# Patient Record
Sex: FEMALE | Race: BLACK OR AFRICAN AMERICAN | NOT HISPANIC OR LATINO | Employment: FULL TIME | URBAN - METROPOLITAN AREA
[De-identification: names, ages, dates, MRNs, and addresses within clinical notes are randomized per-mention and may not be internally consistent; named-entity substitution may affect disease eponyms.]

---

## 2023-04-17 ENCOUNTER — OFFICE VISIT (OUTPATIENT)
Dept: OBSTETRICS AND GYNECOLOGY | Facility: CLINIC | Age: 28
End: 2023-04-17
Payer: COMMERCIAL

## 2023-04-17 VITALS
BODY MASS INDEX: 34.79 KG/M2 | DIASTOLIC BLOOD PRESSURE: 70 MMHG | HEIGHT: 60 IN | WEIGHT: 177.2 LBS | SYSTOLIC BLOOD PRESSURE: 116 MMHG

## 2023-04-17 DIAGNOSIS — Z87.42 HISTORY OF ABNORMAL CERVICAL PAP SMEAR: ICD-10-CM

## 2023-04-17 DIAGNOSIS — Z01.419 WOMEN'S ANNUAL ROUTINE GYNECOLOGICAL EXAMINATION: Primary | ICD-10-CM

## 2023-04-17 RX ORDER — ACETAMINOPHEN AND CODEINE PHOSPHATE 120; 12 MG/5ML; MG/5ML
1 SOLUTION ORAL DAILY
COMMUNITY

## 2023-04-17 RX ORDER — ESCITALOPRAM OXALATE 10 MG/1
TABLET ORAL
COMMUNITY
Start: 2023-03-16

## 2023-04-17 NOTE — PROGRESS NOTES
Gynecologic Annual Exam Note        Annual Exam        Subjective     HPI  Fareed Garcia is a 28 y.o.  female who presents for annual well woman exam as a established patient. There were no changes to her medical or surgical history since her last visit.. Patient reports problems with: pelvic pain that has been present for about 2-3 months that is mild and intermittent. Patient is concerned for fibroids. Patient's last menstrual period was 2023 (exact date).. Her periods occur every 25-35 days , lasting 5 days. The flow is moderate.. She reports dysmenorrhea is mild, occurring premenstrually and first 1-2 days of flow. Partner Status: Marital Status: .  She is sexually active. She has not had new partners.. STD testing recommendations have been explained to the patient and she does desire STD testing.    Additional OB/GYN History   Current contraception: contraceptive methods: Oral progesterone-only contraceptive  Desires to: continue contraception  Thromboembolic Disease: none  Age of menarche: 10    History of STD: no    Last Pap : 2022. Results: ASCUS. HPV: negative.   Last Completed Pap Smear          Ordered - PAP SMEAR (Every 3 Years) Ordered on 2022  SCANNED - PAP SMEAR                 History of abnormal Pap smear: yes - 2022, ASCUS HPV negative  Gardasil status:completed  Family history of uterine, colon, breast, or ovarian cancer: yes - MGM had breast cancer  Performs monthly Self-Breast Exam: yes  Exercises Regularly:no  Feelings of Anxiety or Depression: yes - both  Tobacco Usage?: No       Current Outpatient Medications:   •  escitalopram (LEXAPRO) 10 MG tablet, , Disp: , Rfl:   •  norethindrone (MICRONOR) 0.35 MG tablet, Take 1 tablet by mouth Daily. Prescribed by PCP, Disp: , Rfl:      Patient denies the need for medication refills today.    OB History        1    Para   0    Term   0       0    AB   1    Living   0       SAB   0     "IAB   1    Ectopic   0    Molar   0    Multiple   0    Live Births   0                Health Maintenance   Topic Date Due   • TDAP/TD VACCINES (2 - Tdap) 04/12/2016   • HEPATITIS C SCREENING  Never done   • ANNUAL PHYSICAL  Never done   • COVID-19 Vaccine (3 - Booster for Pfizer series) 11/20/2021   • Annual Gynecologic Pelvic and Breast Exam  01/28/2023   • INFLUENZA VACCINE  08/01/2023   • PAP SMEAR  01/27/2025   • Pneumococcal Vaccine 0-64  Aged Out       Past Medical History:   Diagnosis Date   • Abnormal Pap smear of cervix    • ADHD (attention deficit hyperactivity disorder)    • GERD (gastroesophageal reflux disease)         Past Surgical History:   Procedure Laterality Date   • WISDOM TOOTH EXTRACTION         The additional following portions of the patient's history were reviewed and updated as appropriate: allergies, current medications, past family history, past medical history, past social history, past surgical history and problem list.    Review of Systems   Constitutional: Negative.    HENT: Negative.    Eyes: Negative.    Respiratory: Negative.    Cardiovascular: Negative.    Gastrointestinal: Negative.    Endocrine: Negative.    Genitourinary: Positive for pelvic pain.   Musculoskeletal: Negative.    Skin: Negative.    Allergic/Immunologic: Negative.    Neurological: Negative.    Hematological: Negative.    Psychiatric/Behavioral: Negative.          I have reviewed and agree with the HPI, ROS, and historical information as entered above. Lilly Hidalgo MD        Objective   /70   Ht 152.4 cm (60\")   Wt 80.4 kg (177 lb 3.2 oz)   LMP 04/11/2023 (Exact Date)   Breastfeeding No   BMI 34.61 kg/m²     Physical Exam  Vitals and nursing note reviewed. Exam conducted with a chaperone present.   Constitutional:       Appearance: She is well-developed.   HENT:      Head: Normocephalic and atraumatic.   Neck:      Thyroid: No thyroid mass or thyromegaly.   Cardiovascular:      Rate and Rhythm: " Normal rate and regular rhythm.      Heart sounds: No murmur heard.  Pulmonary:      Effort: Pulmonary effort is normal. No retractions.      Breath sounds: Normal breath sounds. No wheezing, rhonchi or rales.   Chest:      Chest wall: No mass or tenderness.   Breasts:     Right: Normal. No mass, nipple discharge, skin change or tenderness.      Left: Normal. No mass, nipple discharge, skin change or tenderness.   Abdominal:      General: Bowel sounds are normal.      Palpations: Abdomen is soft. Abdomen is not rigid. There is no mass.      Tenderness: There is no abdominal tenderness. There is no guarding.      Hernia: No hernia is present. There is no hernia in the left inguinal area or right inguinal area.   Genitourinary:     General: Normal vulva.      Exam position: Lithotomy position.      Pubic Area: No rash.       Labia:         Right: No rash, tenderness or lesion.         Left: No rash, tenderness or lesion.       Urethra: No urethral pain or urethral swelling.      Vagina: Normal. No vaginal discharge or lesions.      Cervix: No cervical motion tenderness, discharge, lesion or cervical bleeding.      Uterus: Normal. Not enlarged, not fixed and not tender.       Adnexa:         Right: No mass, tenderness or fullness.          Left: No mass, tenderness or fullness.        Rectum: No external hemorrhoid.   Musculoskeletal:      Cervical back: Normal range of motion. No muscular tenderness.   Neurological:      Mental Status: She is alert and oriented to person, place, and time.   Psychiatric:         Behavior: Behavior normal.            Assessment and Plan    Problem List Items Addressed This Visit        Genitourinary and Reproductive     History of abnormal cervical Pap smear    Overview     1/27/2022-ASCUS.  HPV negative.  Recommend follow-up in 1 year.         Relevant Orders    LIQUID-BASED PAP SMEAR, [facility] ([facility])   Other Visit Diagnoses     Women's annual routine gynecological examination     -  Primary    Relevant Orders    LIQUID-BASED PAP SMEAR, [facility] ([facility])          1. GYN annual well woman exam.   2. Reviewed pap guidelines.   3. Encouraged use of condoms for STD prevention.  4. Recommended use of Vitamin D replacement and getting adequate calcium in her diet. (1500mg)  5. Reviewed monthly self breast exams.  Instructed to call with lumps, pain, or breast discharge.    6. Reviewed BMI and weight loss as preventative health measures.   7. Reviewed exercise as a preventative health measures.   8. Reccommended Flu Vaccine in Fall of each year.  9. RTC in 1 year or PRN with problems  Return in about 1 year (around 4/17/2024) for Annual physical.      Lilly Hidalgo MD  04/17/2023

## 2023-04-19 DIAGNOSIS — B37.9 YEAST INFECTION: Primary | ICD-10-CM

## 2023-04-19 LAB — REF LAB TEST METHOD: NORMAL

## 2023-04-19 RX ORDER — FLUCONAZOLE 150 MG/1
150 TABLET ORAL DAILY
Qty: 2 TABLET | Refills: 0 | Status: SHIPPED | OUTPATIENT
Start: 2023-04-19

## 2023-10-30 ENCOUNTER — TELEPHONE (OUTPATIENT)
Dept: OBSTETRICS AND GYNECOLOGY | Facility: CLINIC | Age: 28
End: 2023-10-30

## 2023-10-30 NOTE — TELEPHONE ENCOUNTER
Hub staff attempted to follow warm transfer process and was unsuccessful     Caller: Fareed Garcia    Relationship to patient: Self    Best call back number: [phone] CALL ANYTIME, IT IS OKAY TO LVM.     Patient is needing: PATIENT RETURNED CALL TO SCHEDULE NEW OB APPT WITH ULTRASOUND. HUB UNABLE TO WARM TRANSFER.

## 2023-10-30 NOTE — TELEPHONE ENCOUNTER
Caller: Fareed Garcia    Relationship to patient: Self    Best call back number: [phone]    Chief complaint: + PREGNANCY TEST     Type of visit: NEW OB AND U/S         Additional notes:PT NEEDS TO SCHEDULE A NEW OB APPT W/ DR SPARROW LMP 9/21/23 UNABLE TO WT CALL

## 2023-11-09 ENCOUNTER — TELEPHONE (OUTPATIENT)
Dept: OBSTETRICS AND GYNECOLOGY | Facility: CLINIC | Age: 28
End: 2023-11-09
Payer: COMMERCIAL

## 2023-11-09 DIAGNOSIS — O21.9 NAUSEA/VOMITING IN PREGNANCY: Primary | ICD-10-CM

## 2023-11-09 RX ORDER — DOXYLAMINE SUCCINATE AND PYRIDOXINE HYDROCHLORIDE 20; 20 MG/1; MG/1
1 TABLET, EXTENDED RELEASE ORAL NIGHTLY PRN
Qty: 60 TABLET | Refills: 0 | Status: SHIPPED | OUTPATIENT
Start: 2023-11-09

## 2023-11-09 NOTE — TELEPHONE ENCOUNTER
Provider: DR SPARROW    Caller: VERONA MORSE    Relationship to Patient: SELF    Pharmacy: [facility], [address], [place], [place]    Phone Number: [phone]    Reason for Call: PATIENT CALLED AND STATED THAT HER PCP PRESCRIBED HER ZOFRAN AND SHE WOULD LIKE TO KNOW IF THAT IS SAFE TO TAKE DURING PREGNANCY - IF NOT, CAN DR SPARROW RECOMMEND / PRESCRIBE SOMETHING ELSE    When was the patient last seen: 4/17/23 (NEW OB & U/S SCHED. FOR 11/28/23    When did it start: ABOUT TWO WEEKS AGO    Characteristics of symptom/severity: NAUSEA ALL DAY - HAS BEEN ABLE TO KEEP FOOD DOWN MOSTLY, BUT EATING SMALL AMOUNTS OF FOOD OR SNACKS MAKES HER FEEL WORSE - LARGER MEALS SEEM TO HELP    What therapies/medications have you tried: TRIED SNACKING, DIONE, PEPPERMINT, ETC AND IT HASN'T HELPED

## 2023-11-09 NOTE — TELEPHONE ENCOUNTER
Called patient and let her know that zofran is not usually recommended in first trimester. Sent bonjesta rx in to for patient to try. Hypoglycemia precautions, increase hydration, VU.

## 2023-11-16 ENCOUNTER — HOSPITAL ENCOUNTER (EMERGENCY)
Facility: HOSPITAL | Age: 28
Discharge: HOME | End: 2023-11-16
Attending: STUDENT IN AN ORGANIZED HEALTH CARE EDUCATION/TRAINING PROGRAM
Payer: COMMERCIAL

## 2023-11-16 ENCOUNTER — APPOINTMENT (OUTPATIENT)
Dept: ULTRASOUND IMAGING | Facility: HOSPITAL | Age: 28
End: 2023-11-16
Payer: COMMERCIAL

## 2023-11-16 VITALS
SYSTOLIC BLOOD PRESSURE: 129 MMHG | WEIGHT: 188 LBS | RESPIRATION RATE: 18 BRPM | TEMPERATURE: 98 F | OXYGEN SATURATION: 100 % | BODY MASS INDEX: 36.91 KG/M2 | DIASTOLIC BLOOD PRESSURE: 71 MMHG | HEART RATE: 105 BPM | HEIGHT: 60 IN
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
## 2023-11-16 DIAGNOSIS — Z3A.08 8 WEEKS GESTATION OF PREGNANCY: ICD-10-CM

## 2023-11-16 DIAGNOSIS — R10.9 ABDOMINAL CRAMPING: ICD-10-CM

## 2023-11-16 DIAGNOSIS — S80.02XA CONTUSION OF LEFT KNEE, INITIAL ENCOUNTER: ICD-10-CM

## 2023-11-16 PROCEDURE — 99284 EMERGENCY DEPT VISIT MOD MDM: CPT

## 2023-11-16 PROCEDURE — 76817 TRANSVAGINAL US OBSTETRIC: CPT

## 2023-11-17 ENCOUNTER — TELEPHONE (OUTPATIENT)
Dept: OBSTETRICS AND GYNECOLOGY | Facility: CLINIC | Age: 28
End: 2023-11-17
Payer: COMMERCIAL
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
## 2023-11-20 ENCOUNTER — TELEPHONE (OUTPATIENT)
Dept: OBSTETRICS AND GYNECOLOGY | Facility: CLINIC | Age: 28
End: 2023-11-20

## 2023-11-20 ENCOUNTER — TELEPHONE (OUTPATIENT)
Dept: OBSTETRICS AND GYNECOLOGY | Facility: CLINIC | Age: 28
End: 2023-11-20
Payer: COMMERCIAL
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
## 2023-11-20 NOTE — TELEPHONE ENCOUNTER
PATIENT IS NEEDING A NOTE FAXED TO PHYSICAL THERAPY STATING SHE CAN GET AN MRI. FAX NUMBER: [phone]
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
## 2023-11-27 ENCOUNTER — TELEPHONE (OUTPATIENT)
Dept: OBSTETRICS AND GYNECOLOGY | Facility: CLINIC | Age: 28
End: 2023-11-27
Payer: COMMERCIAL

## 2023-11-28 ENCOUNTER — INITIAL PRENATAL (OUTPATIENT)
Dept: OBSTETRICS AND GYNECOLOGY | Facility: CLINIC | Age: 28
End: 2023-11-28
Payer: COMMERCIAL

## 2023-11-28 ENCOUNTER — TELEPHONE (OUTPATIENT)
Dept: OBSTETRICS AND GYNECOLOGY | Facility: CLINIC | Age: 28
End: 2023-11-28

## 2023-11-28 VITALS — DIASTOLIC BLOOD PRESSURE: 66 MMHG | WEIGHT: 193 LBS | BODY MASS INDEX: 37.69 KG/M2 | SYSTOLIC BLOOD PRESSURE: 110 MMHG

## 2023-11-28 DIAGNOSIS — F32.A DEPRESSION, UNSPECIFIED DEPRESSION TYPE: ICD-10-CM

## 2023-11-28 DIAGNOSIS — O99.210 OBESITY IN PREGNANCY, ANTEPARTUM: ICD-10-CM

## 2023-11-28 DIAGNOSIS — Z34.90 PRENATAL CARE, ANTEPARTUM: Primary | ICD-10-CM

## 2023-11-28 LAB — GP B STREP RRNA SPEC QL PROBE: POSITIVE

## 2023-11-28 PROCEDURE — 0501F PRENATAL FLOW SHEET: CPT | Performed by: OBSTETRICS & GYNECOLOGY

## 2023-11-28 NOTE — TELEPHONE ENCOUNTER
Caller:     Relationship:     Best call back number: [phone]    What is the medical concern/diagnosis: MRI REFERRAL    What specialty or service is being requested: MRI    What is the provider, practice or medical service name: [facility]      What is the office FAX number: [phone]    Any additional details: PATIENT CALLED INTO PRACTICE STATING SHE NEEDS AN ORDER FOR AN MRI FROM DR SPARROW FAXED TO [facility]. SHE NEEDS THIS COMPLETED IN ORDER TO RECEIVE HER MRI BY 11/29. PATIENT WOULD LIKE A CALL BACK ONCE THIS IS COMPLETED.
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
## 2023-11-28 NOTE — PROGRESS NOTES
Initial ob visit     CC- Here for care of pregnancy        Fareed Garcia is a 28 y.o. female, , who presents for her first obstetrical visit.  Her last LMP was Patient's last menstrual period was 2023 (exact date).. Her BRANDYN is 2024, by Last Menstrual Period. Current GA is 9w4d.     Initial positive test date : 10/30/23, UPT        Her periods are: every 4 weeks  Prior obstetric issues: none  Patient's past medical history is significant for:  none .  Family history of genetic issues (includes FOB): none  Prior infections concerning in pregnancy (Rash, fever in last 2 weeks): No  Varicella Hx - unknown   Prior testing for Cystic Fibrosis Carrier or Sickle Cell Trait- never  Prepregnancy BMI - Body mass index is 37.69 kg/m².  History of STD: no  Hx of HSV for patient or partner: no  US done today: Yes.  Findings showed single viable IUP measuring 9w2d which is c/w LMP.  I have personally evaluated the U/S and agree with the findings. Lilly Hidalgo MD      OB History    Para Term  AB Living   2 0 0 0 1 0   SAB IAB Ectopic Molar Multiple Live Births   0 1 0 0 0 0      # Outcome Date GA Lbr Noe/2nd Weight Sex Delivery Anes PTL Lv   2 Current            1 IAB                Additional Pertinent History   Last Pap : 23 Result: negative HPV: not done     Last Completed Pap Smear            PAP SMEAR (Every 3 Years) Next due on 2023  LIQUID-BASED PAP SMEAR, [facility] ([facility])    2022  SCANNED - PAP SMEAR                  History of abnormal Pap smear: no  Family history of uterine, colon, breast, or ovarian cancer: yes - MGM had breast cancer  Feelings of Anxiety or Depression: yes - some. She had to come off her medication due to pregnancy. She would like to discuss options though. She was on Wellbutrin and Buspar  Tobacco Usage?: No   Alcohol/Drug Use?: NO  Over the age of 35 at delivery: no  Desires Genetic Screening: discussed  Flu Status:  Already given in current flu season    PMH  No current outpatient medications on file.     Past Medical History:   Diagnosis Date    Abnormal Pap smear of cervix     ADHD (attention deficit hyperactivity disorder)     Depression 2023    GERD (gastroesophageal reflux disease)         Past Surgical History:   Procedure Laterality Date    BILATERAL BREAST REDUCTION      WISDOM TOOTH EXTRACTION         Review of Systems   Review of Systems    Patient Reports: Nausea and vomiting. Vomiting is infrequent  Patient Denies: Spotting and Heavy bleeding  All systems reviewed and otherwise normal.    I have reviewed and agree with the HPI, ROS, and historical information as entered above. Lilly Hidalgo MD      /66   Wt 87.5 kg (193 lb)   LMP 2023 (Exact Date)   BMI 37.69 kg/m²     The additional following portions of the patient's history were reviewed and updated as appropriate: allergies, current medications, past family history, past medical history, past social history, past surgical history, and problem list.    Physical Exam  General:  well developed; well nourished  no acute distress   Chest/Respiratory: No labored breathing, normal respiratory effort, normal appearance, no respiratory noises noted   Heart:  normal rate, regular rhythm,  no murmurs, rubs, or gallops   Thyroid: normal to inspection and palpation   Breasts:  Not performed.   Abdomen: soft, non-tender; no masses  no umbilical or inguinal hernias are present  no hepato-splenomegaly   Pelvis: Not performed.        Assessment and Plan    Problem List Items Addressed This Visit          Gravid and     Prenatal care, antepartum - Primary    Relevant Orders    Obstetric Panel    HIV-1 / O / 2 Ag / Antibody    Urine Culture - Urine, Urine, Clean Catch    Urinalysis With Microscopic - Urine, Clean Catch    Chlamydia trachomatis, Neisseria gonorrhoeae, PCR - Urine, Urine, Clean Catch    Hemoglobin A1c    Obesity in pregnancy,  antepartum    Overview     Hemoglobin A1c at new OB  Early Glucola  Baby aspirin weeks 12 through 36  Growth ultrasound at 32 and 37 weeks  Discussed carbohydrate control, daily exercise, water intake.            Mental Health    Depression    Overview     Following with psych counselor.  Considering medicine after first trimester.            Pregnancy at 9w4d  Reviewed routine prenatal care with the office and educational materials given  Discussed options for genetic testing including first trimester nuchal translucency screen, genetic disease carrier testing, quadruple screen, and NIPT  Recommend limiting weight gain to 15 to 20 pounds in pregnancy.   Discussed carbohydrate control.   hgb A1C today  Recommended early 1 hr gtt next visit  discussed baby aspirin from 10-36 weeks for prevention of preeclampsia   Return in about 4 weeks (around 12/26/2023) for glucola.      Lilly Hidalgo MD  11/28/2023

## 2023-11-29 LAB
ABO GROUP BLD: NORMAL
APPEARANCE UR: CLEAR
BACTERIA #/AREA URNS HPF: ABNORMAL /[HPF]
BASOPHILS # BLD AUTO: 0 X10E3/UL (ref 0–0.2)
BASOPHILS NFR BLD AUTO: 0 %
BILIRUB UR QL STRIP: NEGATIVE
BLD GP AB SCN SERPL QL: NEGATIVE
C TRACH RRNA SPEC QL NAA+PROBE: NEGATIVE
CASTS URNS QL MICRO: ABNORMAL /LPF
COLOR UR: YELLOW
EOSINOPHIL # BLD AUTO: 0 X10E3/UL (ref 0–0.4)
EOSINOPHIL NFR BLD AUTO: 0 %
EPI CELLS #/AREA URNS HPF: ABNORMAL /HPF (ref 0–10)
ERYTHROCYTE [DISTWIDTH] IN BLOOD BY AUTOMATED COUNT: 12.1 % (ref 11.7–15.4)
GLUCOSE UR QL STRIP: ABNORMAL
HBA1C MFR BLD: 6.2 % (ref 4.8–5.6)
HBV SURFACE AG SERPL QL IA: NEGATIVE
HCT VFR BLD AUTO: 37.8 % (ref 34–46.6)
HCV IGG SERPL QL IA: NON REACTIVE
HGB BLD-MCNC: 12.4 G/DL (ref 11.1–15.9)
HGB UR QL STRIP: NEGATIVE
HIV 1+2 AB+HIV1 P24 AG SERPL QL IA: NON REACTIVE
IMM GRANULOCYTES # BLD AUTO: 0.1 X10E3/UL (ref 0–0.1)
IMM GRANULOCYTES NFR BLD AUTO: 1 %
KETONES UR QL STRIP: NEGATIVE
LEUKOCYTE ESTERASE UR QL STRIP: NEGATIVE
LYMPHOCYTES # BLD AUTO: 2.6 X10E3/UL (ref 0.7–3.1)
LYMPHOCYTES NFR BLD AUTO: 29 %
MCH RBC QN AUTO: 29.9 PG (ref 26.6–33)
MCHC RBC AUTO-ENTMCNC: 32.8 G/DL (ref 31.5–35.7)
MCV RBC AUTO: 91 FL (ref 79–97)
MICRO URNS: ABNORMAL
MICRO URNS: ABNORMAL
MONOCYTES # BLD AUTO: 0.8 X10E3/UL (ref 0.1–0.9)
MONOCYTES NFR BLD AUTO: 9 %
N GONORRHOEA RRNA SPEC QL NAA+PROBE: NEGATIVE
NEUTROPHILS # BLD AUTO: 5.5 X10E3/UL (ref 1.4–7)
NEUTROPHILS NFR BLD AUTO: 61 %
NITRITE UR QL STRIP: NEGATIVE
PH UR STRIP: 7 [PH] (ref 5–7.5)
PLATELET # BLD AUTO: 217 X10E3/UL (ref 150–450)
PROT UR QL STRIP: ABNORMAL
RBC # BLD AUTO: 4.15 X10E6/UL (ref 3.77–5.28)
RBC #/AREA URNS HPF: ABNORMAL /HPF (ref 0–2)
RH BLD: POSITIVE
RPR SER QL: NON REACTIVE
RUBV IGG SERPL IA-ACNC: 1.48 INDEX
SP GR UR STRIP: 1.02 (ref 1–1.03)
UROBILINOGEN UR STRIP-MCNC: 0.2 MG/DL (ref 0.2–1)
WBC # BLD AUTO: 9 X10E3/UL (ref 3.4–10.8)
WBC #/AREA URNS HPF: ABNORMAL /HPF (ref 0–5)

## 2023-12-01 LAB
BACTERIA UR CULT: ABNORMAL
BACTERIA UR CULT: ABNORMAL

## 2023-12-05 DIAGNOSIS — B95.1 POSITIVE GBS TEST: Primary | ICD-10-CM

## 2023-12-05 RX ORDER — AMOXICILLIN 500 MG/1
500 CAPSULE ORAL 2 TIMES DAILY
Qty: 20 CAPSULE | Refills: 0 | Status: SHIPPED | OUTPATIENT
Start: 2023-12-05 | End: 2023-12-15
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
## 2023-12-22 ENCOUNTER — ROUTINE PRENATAL (OUTPATIENT)
Dept: OBSTETRICS AND GYNECOLOGY | Facility: CLINIC | Age: 28
End: 2023-12-22
Payer: COMMERCIAL

## 2023-12-22 VITALS — BODY MASS INDEX: 38.94 KG/M2 | DIASTOLIC BLOOD PRESSURE: 66 MMHG | SYSTOLIC BLOOD PRESSURE: 116 MMHG | WEIGHT: 199.4 LBS

## 2023-12-22 DIAGNOSIS — O99.210 OBESITY IN PREGNANCY, ANTEPARTUM: ICD-10-CM

## 2023-12-22 DIAGNOSIS — B95.1 POSITIVE GBS TEST: ICD-10-CM

## 2023-12-22 DIAGNOSIS — F32.A DEPRESSION, UNSPECIFIED DEPRESSION TYPE: ICD-10-CM

## 2023-12-22 DIAGNOSIS — Z34.90 PRENATAL CARE, ANTEPARTUM: Primary | ICD-10-CM

## 2023-12-22 DIAGNOSIS — O21.9 NAUSEA AND VOMITING IN PREGNANCY: ICD-10-CM

## 2023-12-22 LAB
GLUCOSE 1H P 50 G GLC PO SERPL-MCNC: 173 MG/DL (ref 65–139)
GLUCOSE UR STRIP-MCNC: ABNORMAL MG/DL
PROT UR STRIP-MCNC: NEGATIVE MG/DL

## 2023-12-22 RX ORDER — ONDANSETRON 4 MG/1
4 TABLET, ORALLY DISINTEGRATING ORAL EVERY 8 HOURS PRN
Qty: 20 TABLET | Refills: 0 | Status: SHIPPED | OUTPATIENT
Start: 2023-12-22

## 2023-12-22 RX ORDER — PRENATAL VIT NO.126/IRON/FOLIC 28MG-0.8MG
1 TABLET ORAL DAILY
COMMUNITY

## 2023-12-22 NOTE — PROGRESS NOTES
OB FOLLOW UP  CC- Here for care of pregnancy        Fareed Garcia is a 28 y.o.  13w0d patient being seen today for her obstetrical follow up visit. Patient reports nausea with vomiting since beginning of pregnancy. She reports vomiting once per day and typically at night. She states it happens 4 times per week. She has tried unisom/vit B6, preggie pops, peppermints, and saltines. She states nothing is really working. She is asking for zofran.     She has early gtt today. Blood glucose to be drawn at 11:10 AM.    Her prenatal care is complicated by (and status) :   Patient Active Problem List   Diagnosis    History of abnormal cervical Pap smear    Prenatal care, antepartum    Depression    Obesity in pregnancy, antepartum    Positive GBS test       Desires genetic testing?: No  Flu Status: Already given in current flu season  Ultrasound Today: No    ROS -   Patient Reports : Nausea and Vomiting. She reports vomiting 4 times per week  Patient Denies: Loss of Fluid, Vaginal Spotting, Vision Changes, and Headaches  Fetal Movement :  absent  All other systems reviewed and are negative.     The additional following portions of the patient's history were reviewed and updated as appropriate: allergies, current medications, past family history, past medical history, past social history, past surgical history, and problem list.    I have reviewed and agree with the HPI, ROS, and historical information as entered above. Lilly Hidalgo MD          /66   Wt 90.4 kg (199 lb 6.4 oz)   LMP 2023 (Exact Date)   BMI 38.94 kg/m²         EXAM:     Prenatal Vitals  BP: 116/66  Weight: 90.4 kg (199 lb 6.4 oz)   Fetal Heart Rate: 148          Urine Glucose Read-only: (!) Trace  Urine Protein Read-only: Negative       Assessment and Plan    Problem List Items Addressed This Visit          Gravid and     Prenatal care, antepartum - Primary    Relevant Orders    POC Urinalysis Dipstick (Completed)     Gestational Screen 1 Hr ([facility])    Obesity in pregnancy, antepartum    Overview     Hemoglobin A1c at new OB 6.2  Early Glucola  Baby aspirin weeks 12 through 36  Growth ultrasound at 32 and 37 weeks  Discussed carbohydrate control, daily exercise, water intake.         Relevant Orders    Gestational Screen 1 Hr ([facility])       Mental Health    Depression    Overview     Following with psych counselor.  Considering medicine after first trimester.            Other    Positive GBS test    Overview     Seen at urine at new OB visit          Other Visit Diagnoses       Nausea and vomiting in pregnancy        Relevant Medications    ondansetron ODT (ZOFRAN-ODT) 4 MG disintegrating tablet            Pregnancy at 13w0d  Labs reviewed from New OB Visit.  Counseled on genetic testing, carrier status and option for NT screen  Activity and Exercise discussed.  Nausea/Vomiting - desires medication.  Options discussed of Diclegis/Bonjesta, Promethazine, and Zofran  Patient is on Prenatal vitamins  Return in about 4 weeks (around 1/19/2024).    Lilly Hidalgo MD  12/22/2023

## 2023-12-29 ENCOUNTER — TELEPHONE (OUTPATIENT)
Dept: OBSTETRICS AND GYNECOLOGY | Facility: CLINIC | Age: 28
End: 2023-12-29
Payer: COMMERCIAL

## 2023-12-29 NOTE — TELEPHONE ENCOUNTER
YOVANI patient.   14w0d.     Notified patient of failed 1 hour testing and the need for 3 hour gtt testing. Educated patient on NPO status and amount of time she would be in office. Patient v/u.

## 2024-01-02 DIAGNOSIS — Z34.90 PRENATAL CARE, ANTEPARTUM: Primary | ICD-10-CM

## 2024-01-02 PROBLEM — R73.09 ELEVATED GLUCOSE TOLERANCE TEST: Status: ACTIVE | Noted: 2024-01-02

## 2024-01-03 ENCOUNTER — TELEPHONE (OUTPATIENT)
Dept: OBSTETRICS AND GYNECOLOGY | Facility: CLINIC | Age: 29
End: 2024-01-03
Payer: COMMERCIAL

## 2024-01-03 DIAGNOSIS — R73.09 ELEVATED GLUCOSE TOLERANCE TEST: Primary | ICD-10-CM

## 2024-01-03 PROBLEM — O24.419 GESTATIONAL DIABETES MELLITUS (GDM): Status: ACTIVE | Noted: 2024-01-03

## 2024-01-03 LAB
GLUCOSE 1H P 100 G GLC PO SERPL-MCNC: 217 MG/DL (ref 65–179)
GLUCOSE 2H P 100 G GLC PO SERPL-MCNC: 224 MG/DL (ref 65–154)
GLUCOSE 3H P 100 G GLC PO SERPL-MCNC: 198 MG/DL (ref 65–139)
GLUCOSE P FAST SERPL-MCNC: 80 MG/DL (ref 65–94)

## 2024-01-03 NOTE — TELEPHONE ENCOUNTER
Returned patient's call. Reviewed abnormal 3 hour OGTT results, dx of GDM, and referral to endocrinology. Informed her that endocrinology should contact her with appointment information. She v/u and agreed.

## 2024-01-03 NOTE — TELEPHONE ENCOUNTER
Caller: Fareed Garcia    Relationship to patient: Self    Best call back number: [phone]    Patient is needing: PT REQUESTING TO SPEAK TO A NURSE, PT DID NOT PROVIDE HUB WITH ANY DETAILS. PLEASE ADVISE PT.

## 2024-01-04 ENCOUNTER — OFFICE VISIT (OUTPATIENT)
Dept: ENDOCRINOLOGY | Facility: CLINIC | Age: 29
End: 2024-01-04
Payer: COMMERCIAL

## 2024-01-04 VITALS
OXYGEN SATURATION: 94 % | HEIGHT: 60 IN | WEIGHT: 202.2 LBS | SYSTOLIC BLOOD PRESSURE: 104 MMHG | BODY MASS INDEX: 39.7 KG/M2 | HEART RATE: 102 BPM | DIASTOLIC BLOOD PRESSURE: 50 MMHG

## 2024-01-04 DIAGNOSIS — O24.419 GESTATIONAL DIABETES MELLITUS (GDM), ANTEPARTUM, GESTATIONAL DIABETES METHOD OF CONTROL UNSPECIFIED: Primary | ICD-10-CM

## 2024-01-04 PROBLEM — M54.9 BACKACHE: Status: ACTIVE | Noted: 2024-01-04

## 2024-01-04 PROBLEM — M25.50 JOINT PAIN: Status: ACTIVE | Noted: 2024-01-04

## 2024-01-04 PROBLEM — F41.9 ANXIETY: Status: ACTIVE | Noted: 2024-01-04

## 2024-01-04 PROBLEM — R53.83 FATIGUE: Status: ACTIVE | Noted: 2024-01-04

## 2024-01-04 LAB
EXPIRATION DATE: NORMAL
EXPIRATION DATE: NORMAL
GLUCOSE BLDC GLUCOMTR-MCNC: 81 MG/DL (ref 70–130)
HBA1C MFR BLD: 5.6 % (ref 4.5–5.7)
Lab: NORMAL
Lab: NORMAL

## 2024-01-04 NOTE — LETTER
2024       No Recipients    Patient: Fareed Garcia   YOB: 1995   Date of Visit: 2024     Dear Lilly Hidalgo MD:       Thank you for referring Fareed Garcia to me for evaluation. Below are the relevant portions of my assessment and plan of care.    If you have questions, please do not hesitate to call me. I look forward to following Fareed along with you.         Sincerely,        Hayley Zepeda MD        CC:   No Recipients    Hayley Zepeda MD  24 1939  Incomplete  Fareed Garcia 28 y.o.  CC:Establish Care (New patient being seen at the request of Lilly Hidalgo MD for a consultation regarding gestational diabetes, BRANDYN 24)    [place]: Establish Care (New patient being seen at the request of Lilly Hidalgo MD for a consultation regarding gestational diabetes, BARNDYN 24)    Blood sugar and 90 day average sugar reviewed   Results for orders placed or performed in visit on 24   POC Glycosylated Hemoglobin (Hb A1C)    Specimen: Blood   Result Value Ref Range    Hemoglobin A1C 5.6 4.5 - 5.7 %    Lot Number [id number]     Expiration Date 2024    POC Glucose, Blood    Specimen: Blood   Result Value Ref Range    Glucose 81 70 - 130 mg/dL    Lot Number [id number]     Expiration Date 2024      Is  with abnormal 1 hour and 3 hour gtt, also noted to have normal preconception A1c, 5.2%, then high A1c in first trimester 6.2%  After high first trimester A1c she has made dietary changes now with return to normal A1c  She states she craved carbs in early pregnancy but is now drinking more water, eating healthier foods  She questions whether she is diabetic or not and we discussed the fluid nature of A1c, her risk and overall recommendations for both pregnancy and long term health   We discussed the diagnosis of gestational diabetes and reviewed her glucose levels/ glucose tolerance test.  We discussed the concept of carbohydrate awaredness and carbohydrate  content of meals was discussed.  Impact of sweets and other carb containing foods and types of foods typically high in carbohydrates was discussed. Overall guidelines for meal planning related to specific carbohydrate content of meals was discussed and she was provided recommendations about carbohydrates recommended with meals and with snacks.  She was asked to give up any sugared drinks, and avoid breakfast cereal.  Blood sugar testing fasting and after each meal was reviewed and the patient was taught to use a glucometer to test her blood sugar.  Normal values for blood sugar monitoring were discussed as well, with fasting level less than 95, 1 hour pp blood sugar less than 140 and 2 hour blood sugar less than 120 recommended.  Risks related to poor control of blood sugars during pregnancy were discussed with a focus on specific risks to both her and the baby.  Risks discussed include macrosomia (large birthweight), fetal lung immaturity with respiratory distress and low oxygen level, stillbirth, low blood sugar after delivery, high bilirubin/jaundice, and hypocalcemia.  Use of medications during pregnancy (eg metformin and insulin) was discussed.  Her long term risks (outside of pregnancy) for development of Diabetes Mellitus were reviewed and we discussed the importance of healthy lifestyle now and in the future to help reduce the risk of progression to diabetes.  ons.  Her Mother is diabetic and we discussed her long term risk for developing diabetes    Allergies   Allergen Reactions   • Coconut (Cocos Nucifera) Unknown - Low Severity       Current Outpatient Medications:   •  ondansetron ODT (ZOFRAN-ODT) 4 MG disintegrating tablet, Place 1 tablet on the tongue Every 8 (Eight) Hours As Needed for Nausea or Vomiting for up to 20 doses., Disp: 20 tablet, Rfl: 0  •  prenatal vitamin (prenatal, CLASSIC, vitamin) tablet, Take 1 tablet by mouth Daily., Disp: , Rfl:   Patient Active Problem List    Diagnosis    •  "Backache [M54.9]    • Anxiety [F41.9]    • Fatigue [R53.83]    • Joint pain [M25.50]    • Gestational diabetes mellitus (GDM) [O24.419]    • Elevated glucose tolerance test [R73.09]    • Positive GBS test [B95.1]    • Prenatal care, antepartum [Z34.90]    • Depression [F32.A]    • Obesity in pregnancy, antepartum [O99.210]    • History of abnormal cervical Pap smear [Z87.42]      Review of Systems   Constitutional:  Positive for activity change. Negative for unexpected weight change.   HENT:  Negative for congestion.    Gastrointestinal:  Positive for nausea.   Endocrine: Negative for polydipsia.   Musculoskeletal:  Positive for back pain.   Neurological:  Negative for dizziness.   Psychiatric/Behavioral:  Negative for agitation.      Social History     Socioeconomic History   • Marital status:    Tobacco Use   • Smoking status: Never   Substance and Sexual Activity   • Alcohol use: No   • Drug use: No   • Sexual activity: Yes     Partners: Male     Family History   Problem Relation Age of Onset   • Breast cancer Maternal Grandmother         Great Grandmother     /50   Pulse 102   Ht 152.4 cm (60\")   Wt 91.7 kg (202 lb 3.2 oz)   LMP 09/22/2023 (Exact Date)   SpO2 94%   BMI 39.49 kg/m²   Physical Exam  Constitutional:       Appearance: Normal appearance.   Eyes:      Extraocular Movements: Extraocular movements intact.      Pupils: Pupils are equal, round, and reactive to light.   Cardiovascular:      Rate and Rhythm: Normal rate and regular rhythm.      Pulses: Normal pulses.      Heart sounds: No murmur heard.  Pulmonary:      Effort: Pulmonary effort is normal. No respiratory distress.   Musculoskeletal:      Right lower leg: No edema.      Left lower leg: No edema.   Skin:     General: Skin is warm and dry.   Neurological:      General: No focal deficit present.      Mental Status: She is alert and oriented to person, place, and time.   Psychiatric:         Mood and Affect: Mood normal.         " Behavior: Behavior normal.       Results for orders placed or performed in visit on 24   POC Glycosylated Hemoglobin (Hb A1C)    Specimen: Blood   Result Value Ref Range    Hemoglobin A1C 5.6 4.5 - 5.7 %    Lot Number [id number]     Expiration Date 2024    POC Glucose, Blood    Specimen: Blood   Result Value Ref Range    Glucose 81 70 - 130 mg/dL    Lot Number [id number]     Expiration Date 2024      Diagnoses and all orders for this visit:    1. Gestational diabetes mellitus (GDM), antepartum, gestational diabetes method of control unspecified (Primary)  Assessment & Plan:   with no prior h/o diabetes now with early diagnosis of IGT and elevated A1c in early pregnancy  Discussed diet, use of medication during pregnancy reviewed along with goals for glucose control, rationale for goals and risk of higher sugar to baby during pregnancy, long term risk of development of diabetes and diabetes prevention reviewed  She will begin testing sugars fasting and 2 hours after meals and will fax blood sugars weekly to [facility]  We will plan to see her back in 3-4 weeks, or sooner if problems or questions.  Thank you for this referral and please do not hesitate to call for any problems or questi    Orders:  -     POC Glycosylated Hemoglobin (Hb A1C)  -     POC Glucose, Blood    Return in about 6 weeks (around 2/15/2024) for Recheck.    Hayley Zepeda MD  Signed Hayley Soto MD, MD  24 1525  Incomplete  Shani Jose 28 y.o.  CC:Establish Care (New patient being seen at the request of Lilly Hidalgo MD for a consultation regarding gestational diabetes, BRANDYN 24)    [place]: Establish Care (New patient being seen at the request of Lilly Hidalgo MD for a consultation regarding gestational diabetes, BRANDYN 24)    Blood sugar and 90 day average sugar reviewed   Results for orders placed or performed in visit on 24   POC Glycosylated Hemoglobin (Hb A1C)     "Specimen: Blood   Result Value Ref Range    Hemoglobin A1C 5.6 4.5 - 5.7 %    Lot Number [id number]     Expiration Date 07/30/2024    POC Glucose, Blood    Specimen: Blood   Result Value Ref Range    Glucose 81 70 - 130 mg/dL    Lot Number [id number]     Expiration Date 05/23/2024        No Known Allergies    Current Outpatient Medications:   •  ondansetron ODT (ZOFRAN-ODT) 4 MG disintegrating tablet, Place 1 tablet on the tongue Every 8 (Eight) Hours As Needed for Nausea or Vomiting for up to 20 doses., Disp: 20 tablet, Rfl: 0  •  prenatal vitamin (prenatal, CLASSIC, vitamin) tablet, Take 1 tablet by mouth Daily., Disp: , Rfl:   Patient Active Problem List    Diagnosis    • Gestational diabetes mellitus (GDM) [O24.419]    • Elevated glucose tolerance test [R73.09]    • Positive GBS test [B95.1]    • Prenatal care, antepartum [Z34.90]    • Depression [F32.A]    • Obesity in pregnancy, antepartum [O99.210]    • History of abnormal cervical Pap smear [Z87.42]      Review of Systems  Social History     Socioeconomic History   • Marital status:    Tobacco Use   • Smoking status: Never   Substance and Sexual Activity   • Alcohol use: No   • Drug use: No   • Sexual activity: Yes     Partners: Male     Family History   Problem Relation Age of Onset   • Breast cancer Maternal Grandmother         Great Grandmother     /50   Pulse 102   Ht 152.4 cm (60\")   Wt 91.7 kg (202 lb 3.2 oz)   LMP 09/22/2023 (Exact Date)   SpO2 94%   BMI 39.49 kg/m²   Physical Exam  Results for orders placed or performed in visit on 01/02/24   Gestational Screen 3 Hr ([facility])    Specimen: Blood   Result Value Ref Range    Glucose - Fasting 80 65 - 94 mg/dL    Glucose - 1 Hour 217 (H) 65 - 179 mg/dL    Glucose - 2 Hour 224 (H) 65 - 154 mg/dL    Glucose - 3 Hour 198 (H) 65 - 139 mg/dL     Diagnoses and all orders for this visit:    1. Gestational diabetes mellitus (GDM), antepartum, gestational diabetes method of control unspecified " (Primary)  -     POC Glycosylated Hemoglobin (Hb A1C)  -     POC Glucose, Blood      Jessica Bruce MA  Signed Hayley Zepeda MD

## 2024-01-04 NOTE — PROGRESS NOTES
Fareed Garcia 28 y.o.  CC:Establish Care (New patient being seen at the request of Lilly Hidalgo MD for a consultation regarding gestational diabetes, BRANDYN 24)    [facility]: Establish Care (New patient being seen at the request of Lilly Hidalgo MD for a consultation regarding gestational diabetes, BRANDYN 24)    Blood sugar and 90 day average sugar reviewed   Results for orders placed or performed in visit on 24   POC Glycosylated Hemoglobin (Hb A1C)    Specimen: Blood   Result Value Ref Range    Hemoglobin A1C 5.6 4.5 - 5.7 %    Lot Number [id number]     Expiration Date 2024    POC Glucose, Blood    Specimen: Blood   Result Value Ref Range    Glucose 81 70 - 130 mg/dL    Lot Number [id number]     Expiration Date 2024      Is  with abnormal 1 hour and 3 hour gtt, also noted to have normal preconception A1c, 5.2%, then high A1c in first trimester 6.2%  After high first trimester A1c she has made dietary changes now with return to normal A1c  She states she craved carbs in early pregnancy but is now drinking more water, eating healthier foods  She questions whether she is diabetic or not and we discussed the fluid nature of A1c, her risk and overall recommendations for both pregnancy and long term health   We discussed the diagnosis of gestational diabetes and reviewed her glucose levels/ glucose tolerance test.  We discussed the concept of carbohydrate awaredness and carbohydrate content of meals was discussed.  Impact of sweets and other carb containing foods and types of foods typically high in carbohydrates was discussed. Overall guidelines for meal planning related to specific carbohydrate content of meals was discussed and she was provided recommendations about carbohydrates recommended with meals and with snacks.  She was asked to give up any sugared drinks, and avoid breakfast cereal.  Blood sugar testing fasting and after each meal was reviewed and the patient was taught to use a glucometer  to test her blood sugar.  Normal values for blood sugar monitoring were discussed as well, with fasting level less than 95, 1 hour pp blood sugar less than 140 and 2 hour blood sugar less than 120 recommended.  Risks related to poor control of blood sugars during pregnancy were discussed with a focus on specific risks to both her and the baby.  Risks discussed include macrosomia (large birthweight), fetal lung immaturity with respiratory distress and low oxygen level, stillbirth, low blood sugar after delivery, high bilirubin/jaundice, and hypocalcemia.  Use of medications during pregnancy (eg metformin and insulin) was discussed.  Her long term risks (outside of pregnancy) for development of Diabetes Mellitus were reviewed and we discussed the importance of healthy lifestyle now and in the future to help reduce the risk of progression to diabetes.  ons.  Her Mother is diabetic and we discussed her long term risk for developing diabetes    Allergies   Allergen Reactions    Coconut (Cocos Nucifera) Unknown - Low Severity       Current Outpatient Medications:     ondansetron ODT (ZOFRAN-ODT) 4 MG disintegrating tablet, Place 1 tablet on the tongue Every 8 (Eight) Hours As Needed for Nausea or Vomiting for up to 20 doses., Disp: 20 tablet, Rfl: 0    prenatal vitamin (prenatal, CLASSIC, vitamin) tablet, Take 1 tablet by mouth Daily., Disp: , Rfl:   Patient Active Problem List    Diagnosis     Backache [M54.9]     Anxiety [F41.9]     Fatigue [R53.83]     Joint pain [M25.50]     Gestational diabetes mellitus (GDM) [O24.419]     Elevated glucose tolerance test [R73.09]     Positive GBS test [B95.1]     Prenatal care, antepartum [Z34.90]     Depression [F32.A]     Obesity in pregnancy, antepartum [O99.210]     History of abnormal cervical Pap smear [Z87.42]      Review of Systems   Constitutional:  Positive for activity change. Negative for unexpected weight change.   HENT:  Negative for congestion.    Gastrointestinal:   "Positive for nausea.   Endocrine: Negative for polydipsia.   Musculoskeletal:  Positive for back pain.   Neurological:  Negative for dizziness.   Psychiatric/Behavioral:  Negative for agitation.      Social History     Socioeconomic History    Marital status:    Tobacco Use    Smoking status: Never   Substance and Sexual Activity    Alcohol use: No    Drug use: No    Sexual activity: Yes     Partners: Male     Family History   Problem Relation Age of Onset    Breast cancer Maternal Grandmother         Great Grandmother     /50   Pulse 102   Ht 152.4 cm (60\")   Wt 91.7 kg (202 lb 3.2 oz)   LMP 09/22/2023 (Exact Date)   SpO2 94%   BMI 39.49 kg/m²   Physical Exam  Constitutional:       Appearance: Normal appearance.   Eyes:      Extraocular Movements: Extraocular movements intact.      Pupils: Pupils are equal, round, and reactive to light.   Cardiovascular:      Rate and Rhythm: Normal rate and regular rhythm.      Pulses: Normal pulses.      Heart sounds: No murmur heard.  Pulmonary:      Effort: Pulmonary effort is normal. No respiratory distress.   Musculoskeletal:      Right lower leg: No edema.      Left lower leg: No edema.   Skin:     General: Skin is warm and dry.   Neurological:      General: No focal deficit present.      Mental Status: She is alert and oriented to person, place, and time.   Psychiatric:         Mood and Affect: Mood normal.         Behavior: Behavior normal.       Results for orders placed or performed in visit on 01/04/24   POC Glycosylated Hemoglobin (Hb A1C)    Specimen: Blood   Result Value Ref Range    Hemoglobin A1C 5.6 4.5 - 5.7 %    Lot Number [id number]     Expiration Date 07/30/2024    POC Glucose, Blood    Specimen: Blood   Result Value Ref Range    Glucose 81 70 - 130 mg/dL    Lot Number [id number]     Expiration Date 05/23/2024      Diagnoses and all orders for this visit:    1. Gestational diabetes mellitus (GDM), antepartum, gestational diabetes method of " control unspecified (Primary)  Assessment & Plan:   with no prior h/o diabetes now with early diagnosis of IGT and elevated A1c in early pregnancy  Discussed diet, use of medication during pregnancy reviewed along with goals for glucose control, rationale for goals and risk of higher sugar to baby during pregnancy, long term risk of development of diabetes and diabetes prevention reviewed  She will begin testing sugars fasting and 2 hours after meals and will fax blood sugars [facility] to [facility]  We will plan to see her back in 3-4 weeks, or sooner if problems or questions.  Thank you for this referral and please do not hesitate to call for any problems or questi    Orders:  -     POC Glycosylated Hemoglobin (Hb A1C)  -     POC Glucose, Blood    Return in about 6 weeks (around 2/15/2024) for Recheck.    Hayley Zepeda MD  Signed Hayley Zepeda MD

## 2024-01-05 NOTE — ASSESSMENT & PLAN NOTE
with no prior h/o diabetes now with early diagnosis of IGT and elevated A1c in early pregnancy  Discussed diet, use of medication during pregnancy reviewed along with goals for glucose control, rationale for goals and risk of higher sugar to baby during pregnancy, long term risk of development of diabetes and diabetes prevention reviewed  She will begin testing sugars fasting and 2 hours after meals and will fax blood sugars weekly to [facility]  We will plan to see her back in 3-4 weeks, or sooner if problems or questions.  Thank you for this referral and please do not hesitate to call for any problems or questi

## 2024-01-19 ENCOUNTER — ROUTINE PRENATAL (OUTPATIENT)
Dept: OBSTETRICS AND GYNECOLOGY | Facility: CLINIC | Age: 29
End: 2024-01-19
Payer: COMMERCIAL

## 2024-01-19 VITALS — SYSTOLIC BLOOD PRESSURE: 110 MMHG | WEIGHT: 205 LBS | DIASTOLIC BLOOD PRESSURE: 64 MMHG | BODY MASS INDEX: 40.04 KG/M2

## 2024-01-19 DIAGNOSIS — Z34.82 ENCOUNTER FOR SUPERVISION OF OTHER NORMAL PREGNANCY IN SECOND TRIMESTER: Primary | ICD-10-CM

## 2024-01-19 DIAGNOSIS — Z36.89 ENCOUNTER FOR FETAL ANATOMIC SURVEY: ICD-10-CM

## 2024-01-19 DIAGNOSIS — O24.410 DIET CONTROLLED GESTATIONAL DIABETES MELLITUS (GDM) IN SECOND TRIMESTER: ICD-10-CM

## 2024-01-19 LAB
GLUCOSE UR STRIP-MCNC: ABNORMAL MG/DL
PROT UR STRIP-MCNC: ABNORMAL MG/DL

## 2024-01-19 NOTE — PROGRESS NOTES
OB FOLLOW UP  CC- Here for care of pregnancy        Fareed Garcia is a 28 y.o.  17w0d patient being seen today for her obstetrical follow up visit. Patient reports occasional headaches and nausea has improved.  Patient reports she has crying spells often and feels more withdrawn.  She stopped Wellbutrin and Buspar with + UPT.  She denies SI/HI.  She saw Dr. Zepeda but has not received testing supplies to start monitoring BS.  Verbal order given to pharmacy today.    Her prenatal care is complicated by (and status) :   Patient Active Problem List   Diagnosis    History of abnormal cervical Pap smear    Prenatal care, antepartum    Depression    Obesity in pregnancy, antepartum    Positive GBS test    Elevated glucose tolerance test    Gestational diabetes mellitus (GDM)    Backache    Anxiety    Fatigue    Joint pain       Flu Status: Already given in current flu season  Ultrasound Today: No    AFP: declines today    ROS -   Patient Denies: Loss of Fluid, Vaginal Spotting, Vision Changes, and Contractions  Fetal Movement : absent  All other systems reviewed and are negative.       The additional following portions of the patient's history were reviewed and updated as appropriate: allergies, current medications, past family history, past medical history, past social history, past surgical history, and problem list.      I have reviewed and agree with the HPI, ROS, and historical information as entered above. Ariella Roger, APRN          EXAM:     Prenatal Vitals  BP: 110/64  Weight: 93 kg (205 lb)   Fetal Heart Rate: 140   Pelvic Exam:        Urine Glucose Read-only: (!) 1+  Urine Protein Read-only: (!) Trace           Assessment and Plan    Problem List Items Addressed This Visit          Endocrine and Metabolic    Gestational diabetes mellitus (GDM)    Overview     Abnormal 3 hr OGTT @ 14w 4d;   referral to endocrinology 1/3/24  Sends blood sugars to [facility] every week         Relevant Orders    US  Ob 14 + Weeks Single or First Gestation     Other Visit Diagnoses       Encounter for supervision of other normal pregnancy in second trimester    -  Primary    Relevant Orders    POC Urinalysis Dipstick (Completed)    Encounter for fetal anatomic survey        Relevant Orders    US Ob 14 + Weeks Single or First Gestation            Pregnancy at 17w0d  Fetal status reassuring.   Counseled on MSAFP alone in relation to OTD and placental issues-declines  Anatomy scan next visit.   Activity and Exercise discussed.  Verbal order for blood glucose monitoring supplies given today (patient did not receive prior to todays visit)  U/S ordered at follow up  Patient is on Prenatal vitamins  Follow up 2-3 weeks for anatomy scan.    Ariella Roger, APRN  01/19/2024

## 2024-02-15 ENCOUNTER — ROUTINE PRENATAL (OUTPATIENT)
Dept: OBSTETRICS AND GYNECOLOGY | Facility: CLINIC | Age: 29
End: 2024-02-15
Payer: COMMERCIAL

## 2024-02-15 VITALS — SYSTOLIC BLOOD PRESSURE: 108 MMHG | WEIGHT: 207.2 LBS | DIASTOLIC BLOOD PRESSURE: 64 MMHG | BODY MASS INDEX: 40.47 KG/M2

## 2024-02-15 DIAGNOSIS — O24.410 DIET CONTROLLED GESTATIONAL DIABETES MELLITUS (GDM) IN SECOND TRIMESTER: Primary | ICD-10-CM

## 2024-02-15 DIAGNOSIS — F32.A DEPRESSION, UNSPECIFIED DEPRESSION TYPE: ICD-10-CM

## 2024-02-15 DIAGNOSIS — Z34.90 PRENATAL CARE, ANTEPARTUM: ICD-10-CM

## 2024-02-15 DIAGNOSIS — O99.210 OBESITY IN PREGNANCY, ANTEPARTUM: ICD-10-CM

## 2024-02-15 DIAGNOSIS — B95.1 POSITIVE GBS TEST: ICD-10-CM

## 2024-02-15 PROBLEM — R73.09 ELEVATED GLUCOSE TOLERANCE TEST: Status: RESOLVED | Noted: 2024-01-02 | Resolved: 2024-02-15

## 2024-02-15 LAB
GLUCOSE UR STRIP-MCNC: NEGATIVE MG/DL
PROT UR STRIP-MCNC: NEGATIVE MG/DL

## 2024-02-15 RX ORDER — BLOOD SUGAR DIAGNOSTIC
STRIP MISCELLANEOUS
COMMUNITY
Start: 2024-01-20

## 2024-02-15 RX ORDER — LANCETS
EACH MISCELLANEOUS
COMMUNITY
Start: 2024-01-20

## 2024-02-15 RX ORDER — BLOOD-GLUCOSE METER
EACH MISCELLANEOUS
COMMUNITY
Start: 2024-01-22

## 2024-03-11 ENCOUNTER — ROUTINE PRENATAL (OUTPATIENT)
Dept: OBSTETRICS AND GYNECOLOGY | Facility: CLINIC | Age: 29
End: 2024-03-11
Payer: COMMERCIAL

## 2024-03-11 VITALS — WEIGHT: 212 LBS | BODY MASS INDEX: 41.4 KG/M2 | DIASTOLIC BLOOD PRESSURE: 66 MMHG | SYSTOLIC BLOOD PRESSURE: 108 MMHG

## 2024-03-11 DIAGNOSIS — O24.410 DIET CONTROLLED GESTATIONAL DIABETES MELLITUS (GDM) IN SECOND TRIMESTER: ICD-10-CM

## 2024-03-11 DIAGNOSIS — Z34.90 PRENATAL CARE, ANTEPARTUM: Primary | ICD-10-CM

## 2024-03-11 DIAGNOSIS — O36.5990 POOR FETAL GROWTH AFFECTING MANAGEMENT OF MOTHER, ANTEPARTUM, SINGLE OR UNSPECIFIED FETUS: ICD-10-CM

## 2024-03-11 DIAGNOSIS — O99.210 OBESITY IN PREGNANCY, ANTEPARTUM: ICD-10-CM

## 2024-03-11 DIAGNOSIS — F32.89 OTHER DEPRESSION: ICD-10-CM

## 2024-03-11 DIAGNOSIS — Z87.42 HISTORY OF ABNORMAL CERVICAL PAP SMEAR: ICD-10-CM

## 2024-03-11 DIAGNOSIS — B95.1 POSITIVE GBS TEST: ICD-10-CM

## 2024-03-11 LAB
GLUCOSE UR STRIP-MCNC: NEGATIVE MG/DL
PROT UR STRIP-MCNC: NEGATIVE MG/DL

## 2024-03-11 PROCEDURE — 0502F SUBSEQUENT PRENATAL CARE: CPT

## 2024-03-11 NOTE — PROGRESS NOTES
OB FOLLOW UP  CC- Here for care of pregnancy        Fareed Garcia is a 29 y.o.  24w3d patient being seen today for her obstetrical follow up visit. Patient reports her average fasting BG is 80-97. Her average 2hr PP BG is 110-120, with 2 values in the past week being 122 and 124.    Her prenatal care is complicated by (and status) :   Patient Active Problem List   Diagnosis    History of abnormal cervical Pap smear    Prenatal care, antepartum    Depression    Obesity in pregnancy, antepartum    Positive GBS test    Gestational diabetes mellitus (GDM)    Backache    Anxiety    Fatigue    Joint pain    Poor fetal growth affecting management of mother, antepartum       Flu Status: Already given in current flu season  US done today: Yes.  Findings showed FH are 148 bpm, breech presentation, anterior placenta, three-vessel cord, AF normal, AC 9%, EFW 34%, anatomy appears normal, female. I have personally evaluated the U/S and agree with the findings. MARIOLA Givens    Reviewed 1 hr glucose testing and TDAP next visit.    ROS -   Patient Denies: leaking of fluid, vaginal bleeding, dysuria, excessive vomiting, and more than 6 contractions per hour  Fetal Movement : normal  All other systems reviewed and are negative.       The additional following portions of the patient's history were reviewed and updated as appropriate: allergies and current medications.      I have reviewed and agree with the HPI, ROS, and historical information as entered above. MARIOLA Givens      /66   Wt 96.2 kg (212 lb)   LMP 2023 (Exact Date)   BMI 41.40 kg/m²       EXAM:     Prenatal Vitals  BP: 108/66  Weight: 96.2 kg (212 lb)   Fetal Heart Rate: 148           Urine Glucose Read-only: Negative  Urine Protein Read-only: Negative       Assessment and Plan    Problem List Items Addressed This Visit       Depression    Overview     Following with psych counselor.  Considering medicine after first  trimester.         Gestational diabetes mellitus (GDM)    Overview     Abnormal 3 hr OGTT @ 14w 4d;   referral to endocrinology 1/3/24  Sends blood sugars to [facility] every week         History of abnormal cervical Pap smear    Overview     2022-ASCUS.  HPV negative.  Recommend follow-up in 1 year.         Obesity in pregnancy, antepartum    Overview     Hemoglobin A1c at new OB 6.2  Early Glucola 173  Baby aspirin weeks 12 through 36  Growth ultrasound at 32 and 37 weeks  Discussed carbohydrate control, daily exercise, water intake.         Poor fetal growth affecting management of mother, antepartum    Overview     3/11/2024-AC 9%, EFW 34%.  [facility] referral.         Relevant Orders    [facility]    Positive GBS test    Overview     Seen at urine at new OB visit         Prenatal care, antepartum - Primary    Relevant Orders    POC Urinalysis Dipstick (Completed)       Pregnancy at 24w3d  Fetal status reassuring.  Anatomy scan completed today and within normal limits with the exception of small AC at 9%.  Ultrasound reviewed with patient.  Refer to [facility].  1 hour gtt, CBC, Antibody screen, TDAP, and RPR next visit. Instructions given  PTL precautions  Discussed/encouraged TDAP vaccination after 28 weeks  Discussed/encouraged social distancing/COVID19 precautions; encouraged vaccination when able  Reviewed Pre-eclampsia signs/symptoms  Activity and Exercise discussed.  Blood glucose glucose goals reviewed.  Recommend fasting <90 and 2-hour postprandial <120.  Fetal growth restriction education included in patient instructions.   Return for [facility], Post [facility] appt- 9%AC.      Pat Dias, APRN  2024

## 2024-03-12 ENCOUNTER — TELEPHONE (OUTPATIENT)
Dept: OBSTETRICS AND GYNECOLOGY | Facility: CLINIC | Age: 29
End: 2024-03-12
Payer: COMMERCIAL

## 2024-03-19 ENCOUNTER — HOSPITAL ENCOUNTER (OUTPATIENT)
Dept: WOMENS IMAGING | Facility: HOSPITAL | Age: 29
Discharge: HOME | End: 2024-03-19
Payer: COMMERCIAL

## 2024-03-19 ENCOUNTER — ROUTINE PRENATAL (OUTPATIENT)
Dept: OBSTETRICS AND GYNECOLOGY | Facility: CLINIC | Age: 29
End: 2024-03-19
Payer: COMMERCIAL

## 2024-03-19 ENCOUNTER — OFFICE VISIT (OUTPATIENT)
Dept: OBSTETRICS AND GYNECOLOGY | Facility: HOSPITAL | Age: 29
End: 2024-03-19
Payer: COMMERCIAL

## 2024-03-19 VITALS — SYSTOLIC BLOOD PRESSURE: 118 MMHG | BODY MASS INDEX: 41.83 KG/M2 | WEIGHT: 214.2 LBS | DIASTOLIC BLOOD PRESSURE: 62 MMHG

## 2024-03-19 VITALS — SYSTOLIC BLOOD PRESSURE: 110 MMHG | WEIGHT: 213 LBS | DIASTOLIC BLOOD PRESSURE: 70 MMHG | BODY MASS INDEX: 41.6 KG/M2

## 2024-03-19 DIAGNOSIS — Z3A.25 25 WEEKS GESTATION OF PREGNANCY: ICD-10-CM

## 2024-03-19 DIAGNOSIS — O99.210 OBESITY IN PREGNANCY, ANTEPARTUM: ICD-10-CM

## 2024-03-19 DIAGNOSIS — O24.410 DIET CONTROLLED GESTATIONAL DIABETES MELLITUS (GDM), ANTEPARTUM: ICD-10-CM

## 2024-03-19 DIAGNOSIS — O36.5990 POOR FETAL GROWTH AFFECTING MANAGEMENT OF MOTHER, ANTEPARTUM, SINGLE OR UNSPECIFIED FETUS: ICD-10-CM

## 2024-03-19 DIAGNOSIS — O24.410 DIET CONTROLLED GESTATIONAL DIABETES MELLITUS (GDM), ANTEPARTUM: Primary | ICD-10-CM

## 2024-03-19 DIAGNOSIS — Q21.0 VSD (VENTRICULAR SEPTAL DEFECT): ICD-10-CM

## 2024-03-19 DIAGNOSIS — O36.5990 POOR FETAL GROWTH AFFECTING MANAGEMENT OF MOTHER, ANTEPARTUM, SINGLE OR UNSPECIFIED FETUS: Primary | ICD-10-CM

## 2024-03-19 DIAGNOSIS — B95.1 POSITIVE GBS TEST: ICD-10-CM

## 2024-03-19 DIAGNOSIS — Z34.90 PRENATAL CARE, ANTEPARTUM: ICD-10-CM

## 2024-03-19 PROCEDURE — 93325 DOPPLER ECHO COLOR FLOW MAPG: CPT

## 2024-03-19 PROCEDURE — 76811 OB US DETAILED SNGL FETUS: CPT

## 2024-03-19 PROCEDURE — 76825 ECHO EXAM OF FETAL HEART: CPT

## 2024-03-19 RX ORDER — PEN NEEDLE, DIABETIC 30 GX3/16"
1 NEEDLE, DISPOSABLE MISCELLANEOUS 4 TIMES DAILY
Qty: 100 EACH | Refills: 2 | Status: SHIPPED | OUTPATIENT
Start: 2024-03-19

## 2024-03-19 RX ORDER — ACYCLOVIR 400 MG/1
1 TABLET ORAL
Qty: 3 EACH | Refills: 3 | Status: SHIPPED | OUTPATIENT
Start: 2024-03-19

## 2024-03-19 NOTE — PROGRESS NOTES
OB FOLLOW UP  CC- Here for care of pregnancy        Fareed Garcia is a 29 y.o.  25w4d patient being seen today for her obstetrical follow up visit. Patient reports her fasting BG has ranged 89-97 with average of 94. Her 2hr PP BG has been 105-126 with average in the 110s. She seen [facility] today and they discussed getting a Dexcom for her diabetes and starting 10u of insulin at night. Dr. Zepeda has been managing her GDM thus far.     Her prenatal care is complicated by (and status) :   Patient Active Problem List   Diagnosis    History of abnormal cervical Pap smear    Prenatal care, antepartum    Depression    Obesity in pregnancy, antepartum    Positive GBS test    Gestational diabetes mellitus (GDM)    Backache    Anxiety    Fatigue    Joint pain    Poor fetal growth affecting management of mother, antepartum         Ultrasound Today: Yes. At [facility] per patient report: everything was measuring on target. Recommended she follow up there in 4 weeks.  Reviewed TDAP next visit.    ROS -   Patient Denies: leaking of fluid, vaginal bleeding, and more than 6 contractions per hour  Fetal Movement : normal  All other systems reviewed and are negative.       The additional following portions of the patient's history were reviewed and updated as appropriate: allergies, current medications, past family history, past medical history, past social history, past surgical history, and problem list.      I have reviewed and agree with the HPI, ROS, and historical information as entered above. Lilly Hidalgo MD      /70   Wt 96.6 kg (213 lb)   LMP 2023 (Exact Date)   BMI 41.60 kg/m²       EXAM:     Prenatal Vitals  BP: 110/70  Weight: 96.6 kg (213 lb)                           Assessment and Plan    Problem List Items Addressed This Visit          Endocrine and Metabolic    Gestational diabetes mellitus (GDM)    Overview     Abnormal 3 hr OGTT @ 14w 4d;   referral to endocrinology 1/3/24  Sends  blood sugars to [facility] every week  At 25 weeks and 4 days-Dr. Rios recommended starting insulin and a Dexcom.         Relevant Medications    Insulin Glargine (LANTUS SOLOSTAR) 100 UNIT/ML injection pen    Insulin Pen Needle (Pen Needles) 31G X 5 MM misc    Continuous Blood Gluc Sensor (Dexcom G7 Sensor) misc       Gravid and     Prenatal care, antepartum    Obesity in pregnancy, antepartum    Overview     Hemoglobin A1c at new OB 6.2  Early Glucola 173  Baby aspirin weeks 12 through 36  Growth ultrasound at 32 and 37 weeks  Discussed carbohydrate control, daily exercise, water intake.         Poor fetal growth affecting management of mother, antepartum - Primary    Overview     3/11/2024-AC 9%, EFW 34%.  [facility] referral             Other    Positive GBS test    Overview     Seen at urine at new OB visit            Pregnancy at 25w4d  Fetal status reassuring.  Discussed starting insulin and Dexcom today.  Patient is turning in her numbers to Dr. Rosales today.  She will discuss these recommendations with Dr. Rosales.  [facility] scan reviewed today.  CBC, Antibody screen, TDAP, and RPR next visit. Instructions given  Discussed/encouraged TDAP vaccination after 28 weeks  Activity and Exercise discussed.  Return in about 4 weeks (around 2024) for [facility] same day.      Lilly Hidalgo MD  2024

## 2024-03-19 NOTE — LETTER
2024     MARIOLA Givens  [facility] [facility] [address] [place] [id number]    Patient: Fareed Garcia   YOB: 1995   Date of Visit: 3/19/2024       Dear MARIOLA Givens,    Thank you for referring Fareed Garcia to me for evaluation. Below is a copy of my consult note.    If you have questions, please do not hesitate to call me. I look forward to following Fareed along with you.         Sincerely,        Gali Rios MD        CC: Lilly Hidalgo MD                    Maternal/Fetal Medicine Consult Note     Name: Fareed Garcia    : 1995     MRN: [id number]     Referring Provider: MARIOLA Givens    Chief Complaint  Gdm, Ac lag     Subjective     History of Present Illness:  Fareed Garcia is a 29 y.o.  26w0d who presents today for a fetal anatomic survey, fetal echo, and growth assessment. Patient was diagnosed with GDM in first trimester of pregnancy with 2 values on 3-hr GTT of > 200mg/dl, concerning for possible elevated BG in the organogenesis stage of pregnancy.     She denies LOF/VB/ctx's. +FM.     BRANDYN: Estimated Date of Delivery: 24     ROS:   As noted in HPI.     Past Medical History:   Diagnosis Date   • Abnormal Pap smear of cervix    • ADHD (attention deficit hyperactivity disorder)    • Anxiety 2024   • Depression 2023   • GERD (gastroesophageal reflux disease)    • Gestational diabetes mellitus (GDM) 2024      Past Surgical History:   Procedure Laterality Date   • BILATERAL BREAST REDUCTION     • WISDOM TOOTH EXTRACTION        OB History          2    Para   0    Term   0       0    AB   1    Living   0         SAB   0    IAB   1    Ectopic   0    Molar   0    Multiple   0    Live Births   0          Obstetric Comments   Fob #1 - Pregnancy #1   Fob #2 - Pregnancy #2                Objective     Vital Signs  /62   Wt 97.2 kg (214 lb 3.2 oz)   LMP 2023 (Exact Date)   Estimated body mass  "index is 41.83 kg/m² as calculated from the following:    Height as of 1/4/24: 152.4 cm (60\").    Weight as of this encounter: 97.2 kg (214 lb 3.2 oz).    Physical Exam  Constitutional:       Appearance: Normal appearance. She is normal weight.   HENT:      Head: Normocephalic and atraumatic.   Pulmonary:      Effort: Pulmonary effort is normal.   Musculoskeletal:         General: Normal range of motion.   Neurological:      General: No focal deficit present.      Mental Status: She is alert and oriented to person, place, and time.   Psychiatric:         Mood and Affect: Mood normal.         Behavior: Behavior normal.         Thought Content: Thought content normal.         Judgment: Judgment normal.       Ultrasound Impression:   Vtx, S=D, nl FARRUKH, anterior placenta, nl anatomy, possible very small mid-muscular VSD, nl CL.     Assessment and Plan     Diagnoses and all orders for this visit:    1. Diet controlled gestational diabetes mellitus (GDM), antepartum (Primary)  Assessment & Plan:  Patient had a concerning HgbA1c of 6.2% in very early pregnancy. This was followed by a 1-hr GCT of 173 and a 3-hr GTT of 80/217/224/198. Since that time, she has been trying to manage her GDM with diet alone. She brings her blood glucose log today which shows that her fastings are persistently mildly elevated and many of her post-prandial values are borderline. She is very frustrated.     I reviewed with the patient the options for medication that include Metformin which crosses the placenta and has GI side effects or insulin which does not cross the placenta, does not have side effects, and which allows for her to get a CGM for blood glucose monitoring. She opts to start a low dose of insulin.      - Rx sent in for Dexcom and Lantus 10u qhs   - Patient asked to send us blood glucose info weekly so we can adjust medications as needed   - Also reviewed diet and made recommendations   - Follow-up scheduled in 4wks as growth today " appeared normal    Orders:  -     Insulin Glargine (LANTUS SOLOSTAR) 100 UNIT/ML injection pen; Inject 10 Units under the skin into the appropriate area as directed Every Night.  Dispense: 15 mL; Refill: 2  -     Insulin Pen Needle (Pen Needles) 31G X 5 MM misc; Use 1 each 4 (Four) Times a Day.  Dispense: 100 each; Refill: 2  -     Continuous Blood Gluc Sensor (Dexcom G7 Sensor) misc; Use 1 each Every 10 (Ten) Days.  Dispense: 3 each; Refill: 3  -     [facility]; [facility]    2. Obesity in pregnancy, antepartum  -     [facility]; [facility]    3. Poor fetal growth affecting management of mother, antepartum, single or unspecified fetus  Assessment & Plan:  Patient with ultrasound with primary OB with concern for possible evolving IUGR. On today's exam, growth appears normal.      - Follow-up scheduled in 4wks    Orders:  -     [facility]; [facility]    4. VSD (ventricular septal defect)  Assessment & Plan:  Possible very small mid-muscular VSD seen today. Not consistently visualized/reproducible. Will reassess at next ultrasound.       5. 25 weeks gestation of pregnancy         Follow Up  Return in about 4 weeks (around 2024).    I spent 30 minutes caring for the patient on the day of service. This included: obtaining or reviewing a separately obtained medical history, reviewing patient records, performing a medically appropriate exam and/or evaluation, counseling or educating the patient/family/caregiver, ordering medications, labs, and/or procedures and documenting such in the medical record. This does not include time spent on review and interpretation of other tests such as fetal ultrasound or the performance of other procedures such as amniocentesis or CVS.      Gali Rios MD  2024

## 2024-03-19 NOTE — PROGRESS NOTES
"Pt very frustrated, has been to \"many\" appointments in the last month.  Pt needs explanation of AC lag and \"why she is here\".  Pt reports her baby never cooperates with us at [facility] office and she wants to talk to md about everything today.  Next f/u with [facility] today  NIPT not done.   Pt is Gdm   1 hr gtt 173  3hr gtt 80,217,224,198  No meds at this time, sees [facility]pheries   "

## 2024-03-20 ENCOUNTER — DOCUMENTATION (OUTPATIENT)
Dept: OBSTETRICS AND GYNECOLOGY | Facility: HOSPITAL | Age: 29
End: 2024-03-20
Payer: COMMERCIAL

## 2024-03-21 ENCOUNTER — TELEPHONE (OUTPATIENT)
Dept: OBSTETRICS AND GYNECOLOGY | Facility: HOSPITAL | Age: 29
End: 2024-03-21
Payer: COMMERCIAL

## 2024-03-21 NOTE — TELEPHONE ENCOUNTER
Left voice mail for patient that dexcom has been approved.  Ida is getting it ready now. Instructed to install clarity and dexcom apps and instructions in the box to apply. Will send my chart message as well

## 2024-03-22 PROBLEM — Q21.0 VSD (VENTRICULAR SEPTAL DEFECT): Status: ACTIVE | Noted: 2024-03-22

## 2024-03-22 NOTE — PROGRESS NOTES
"    Maternal/Fetal Medicine Consult Note     Name: Fareed Garcia    : 1995     MRN: [id number]     Referring Provider: MARIOLA Givens    Chief Complaint  Gdm, Ac lag     Subjective     History of Present Illness:  Fareed Garcia is a 29 y.o.  26w0d who presents today for a fetal anatomic survey, fetal echo, and growth assessment. Patient was diagnosed with GDM in first trimester of pregnancy with 2 values on 3-hr GTT of > 200mg/dl, concerning for possible elevated BG in the organogenesis stage of pregnancy.     She denies LOF/VB/ctx's. +FM.     BRANDYN: Estimated Date of Delivery: 24     ROS:   As noted in HPI.     Past Medical History:   Diagnosis Date    Abnormal Pap smear of cervix     ADHD (attention deficit hyperactivity disorder)     Anxiety 2024    Depression 2023    GERD (gastroesophageal reflux disease)     Gestational diabetes mellitus (GDM) 2024      Past Surgical History:   Procedure Laterality Date    BILATERAL BREAST REDUCTION      WISDOM TOOTH EXTRACTION        OB History          2    Para   0    Term   0       0    AB   1    Living   0         SAB   0    IAB   1    Ectopic   0    Molar   0    Multiple   0    Live Births   0          Obstetric Comments   Fob #1 - Pregnancy #1   Fob #2 - Pregnancy #2                Objective     Vital Signs  /62   Wt 97.2 kg (214 lb 3.2 oz)   LMP 2023 (Exact Date)   Estimated body mass index is 41.83 kg/m² as calculated from the following:    Height as of 24: 152.4 cm (60\").    Weight as of this encounter: 97.2 kg (214 lb 3.2 oz).    Physical Exam  Constitutional:       Appearance: Normal appearance. She is normal weight.   HENT:      Head: Normocephalic and atraumatic.   Pulmonary:      Effort: Pulmonary effort is normal.   Musculoskeletal:         General: Normal range of motion.   Neurological:      General: No focal deficit present.      Mental Status: She is alert and oriented to person, place, " and time.   Psychiatric:         Mood and Affect: Mood normal.         Behavior: Behavior normal.         Thought Content: Thought content normal.         Judgment: Judgment normal.       Ultrasound Impression:   Vtx, S=D, nl FARRUKH, anterior placenta, nl anatomy, possible very small mid-muscular VSD, nl CL.     Assessment and Plan     Diagnoses and all orders for this visit:    1. Diet controlled gestational diabetes mellitus (GDM), antepartum (Primary)  Assessment & Plan:  Patient had a concerning HgbA1c of 6.2% in very early pregnancy. This was followed by a 1-hr GCT of 173 and a 3-hr GTT of 80/217/224/198. Since that time, she has been trying to manage her GDM with diet alone. She brings her blood glucose log today which shows that her fastings are persistently mildly elevated and many of her post-prandial values are borderline. She is very frustrated.     I reviewed with the patient the options for medication that include Metformin which crosses the placenta and has GI side effects or insulin which does not cross the placenta, does not have side effects, and which allows for her to get a CGM for blood glucose monitoring. She opts to start a low dose of insulin.      - Rx sent in for Dexcom and Lantus 10u qhs   - Patient asked to send us blood glucose info weekly so we can adjust medications as needed   - Also reviewed diet and made recommendations   - Follow-up scheduled in 4wks as growth today appeared normal    Orders:  -     Insulin Glargine (LANTUS SOLOSTAR) 100 UNIT/ML injection pen; Inject 10 Units under the skin into the appropriate area as directed Every Night.  Dispense: 15 mL; Refill: 2  -     Insulin Pen Needle (Pen Needles) 31G X 5 MM misc; Use 1 each 4 (Four) Times a Day.  Dispense: 100 each; Refill: 2  -     Continuous Blood Gluc Sensor (Dexcom G7 Sensor) misc; Use 1 each Every 10 (Ten) Days.  Dispense: 3 each; Refill: 3  -     [facility]; Future    2. Obesity in pregnancy,  antepartum  -     [facility]    3. Poor fetal growth affecting management of mother, antepartum, single or unspecified fetus  Assessment & Plan:  Patient with ultrasound with primary OB with concern for possible evolving IUGR. On today's exam, growth appears normal.      - Follow-up scheduled in 4wks    Orders:  -     [facility]    4. VSD (ventricular septal defect)  Assessment & Plan:  Possible very small mid-muscular VSD seen today. Not consistently visualized/reproducible. Will reassess at next ultrasound.       5. 25 weeks gestation of pregnancy         Follow Up  Return in about 4 weeks (around 2024).    I spent 30 minutes caring for the patient on the day of service. This included: obtaining or reviewing a separately obtained medical history, reviewing patient records, performing a medically appropriate exam and/or evaluation, counseling or educating the patient/family/caregiver, ordering medications, labs, and/or procedures and documenting such in the medical record. This does not include time spent on review and interpretation of other tests such as fetal ultrasound or the performance of other procedures such as amniocentesis or CVS.      Gali Rios MD  2024

## 2024-03-22 NOTE — ASSESSMENT & PLAN NOTE
Possible very small mid-muscular VSD seen today. Not consistently visualized/reproducible. Will reassess at next ultrasound.

## 2024-03-22 NOTE — ASSESSMENT & PLAN NOTE
Patient had a concerning HgbA1c of 6.2% in very early pregnancy. This was followed by a 1-hr GCT of 173 and a 3-hr GTT of 80/217/224/198. Since that time, she has been trying to manage her GDM with diet alone. She brings her blood glucose log today which shows that her fastings are persistently mildly elevated and many of her post-prandial values are borderline. She is very frustrated.     I reviewed with the patient the options for medication that include Metformin which crosses the placenta and has GI side effects or insulin which does not cross the placenta, does not have side effects, and which allows for her to get a CGM for blood glucose monitoring. She opts to start a low dose of insulin.      - Rx sent in for Dexcom and Lantus 10u qhs   - Patient asked to send us blood glucose info weekly so we can adjust medications as needed   - Also reviewed diet and made recommendations   - Follow-up scheduled in 4wks as growth today appeared normal

## 2024-03-22 NOTE — ASSESSMENT & PLAN NOTE
Patient with ultrasound with primary OB with concern for possible evolving IUGR. On today's exam, growth appears normal.      - Follow-up scheduled in 4wks

## 2024-04-01 ENCOUNTER — TELEPHONE (OUTPATIENT)
Dept: OBSTETRICS AND GYNECOLOGY | Facility: HOSPITAL | Age: 29
End: 2024-04-01
Payer: COMMERCIAL

## 2024-04-01 NOTE — TELEPHONE ENCOUNTER
Attempted to reach patient by phone.  Left message stating dexcom had been approved and could she please send in a sharing code.  Also informed patient if she has any questions or concerns to please call us at [phone]  Tianna Rios RN

## 2024-04-02 ENCOUNTER — TELEPHONE (OUTPATIENT)
Dept: OBSTETRICS AND GYNECOLOGY | Facility: HOSPITAL | Age: 29
End: 2024-04-02
Payer: COMMERCIAL

## 2024-04-02 NOTE — TELEPHONE ENCOUNTER
Attempted to reach patient by phone, received voice mail.  Left message requesting a call back from patient and also request she send in a blood sugar log.  Tianna Rios RN

## 2024-04-03 ENCOUNTER — TELEPHONE (OUTPATIENT)
Dept: OBSTETRICS AND GYNECOLOGY | Facility: CLINIC | Age: 29
End: 2024-04-03
Payer: COMMERCIAL

## 2024-04-03 NOTE — TELEPHONE ENCOUNTER
Pt had a +4:45 appt w/ [facility] and I called at 5:18 to see if she was still coming. Pt stated that she was told by [facility] that she was never to be seen by a NP so she did not show up. She further stated that had a [facility] and F/U w/ [facility] on 4/18 and was specifically told by [facility] that she did not have to come in until that date. We informed her about the 28 wk glucose test and pt stated that she did hers at 16 wks and she was not to do it again.

## 2024-04-09 ENCOUNTER — TELEPHONE (OUTPATIENT)
Dept: OBSTETRICS AND GYNECOLOGY | Facility: HOSPITAL | Age: 29
End: 2024-04-09
Payer: COMMERCIAL

## 2024-04-09 NOTE — TELEPHONE ENCOUNTER
Spoke with patient over the phone.  Patient apologized for not calling back,  She states she has had difficulty getting log to the correct MD office.  Sent patient [employer] message as requested and she states she will send in today.  Tianna Rios RN

## 2024-04-11 ENCOUNTER — MEDICATION THERAPY MANAGEMENT (OUTPATIENT)
Dept: OBSTETRICS AND GYNECOLOGY | Facility: HOSPITAL | Age: 29
End: 2024-04-11
Payer: COMMERCIAL

## 2024-04-11 ENCOUNTER — TELEPHONE (OUTPATIENT)
Dept: OBSTETRICS AND GYNECOLOGY | Facility: HOSPITAL | Age: 29
End: 2024-04-11
Payer: COMMERCIAL

## 2024-04-11 DIAGNOSIS — O24.410 DIET CONTROLLED GESTATIONAL DIABETES MELLITUS (GDM), ANTEPARTUM: ICD-10-CM

## 2024-04-11 NOTE — TELEPHONE ENCOUNTER
Attempted to reach patient by phone.  Received voice mail.  Left message stating Dr. Rios has reviewed her blood sugar log and is increasing her Lantus to 12 units at night.  Requested patient either respond to the "[employer]" message that was sent as well or to call the office at [phone].  Tianna Rios RN

## 2024-04-11 NOTE — TELEPHONE ENCOUNTER
Received call from patient.  Patient states she is confused regarding who is managing her diabetes.  She states she has been referred to endocrinology and has seen Dr. Zepeda, She is seeing [facility] for babies growth and they are managing as well.  Then she says Dr. Hidalgo had told her she didn't want her taking insulin at this time.  Patient currently had prescribed Lantus 10 units at night and after her blood sugar log review this week Dr. Rios wants to increase to 12 units at night.  Patient has an appointment with Dr. Hidalgo and [facility] 4/18/24.  Encourage patient to discuss with MD's at both appointments.  Patient also had question regarding number of appointments she has and that it appears those will be increasing in the next couple of weeks.  Encouraged patient to discuss next week with her doctors.  Patient voices understanding.  Tianna Rios RN

## 2024-04-15 ENCOUNTER — TELEPHONE (OUTPATIENT)
Dept: OBSTETRICS AND GYNECOLOGY | Facility: HOSPITAL | Age: 29
End: 2024-04-15
Payer: COMMERCIAL

## 2024-04-15 NOTE — TELEPHONE ENCOUNTER
Received email from [employer] stating patients insulin glargine required a prior authorization, but that Lantus did not.  Original order was placed for Lantus.  Spoke with Shannon with [facility] pharmacy at [phone].  Shannon ran the Lantus and it went through.  Shannon states they will fill and let patient know.  Tianna Rios RN

## 2024-04-17 PROBLEM — O24.414 INSULIN CONTROLLED GESTATIONAL DIABETES MELLITUS (GDM) IN THIRD TRIMESTER: Status: ACTIVE | Noted: 2024-01-03

## 2024-04-17 PROBLEM — O36.5990 POOR FETAL GROWTH AFFECTING MANAGEMENT OF MOTHER, ANTEPARTUM: Status: RESOLVED | Noted: 2024-03-11 | Resolved: 2024-04-17

## 2024-04-18 ENCOUNTER — HOSPITAL ENCOUNTER (OUTPATIENT)
Dept: WOMENS IMAGING | Facility: HOSPITAL | Age: 29
Discharge: HOME | End: 2024-04-18
Admitting: OBSTETRICS & GYNECOLOGY
Payer: COMMERCIAL

## 2024-04-18 ENCOUNTER — ROUTINE PRENATAL (OUTPATIENT)
Dept: OBSTETRICS AND GYNECOLOGY | Facility: CLINIC | Age: 29
End: 2024-04-18
Payer: COMMERCIAL

## 2024-04-18 ENCOUNTER — OFFICE VISIT (OUTPATIENT)
Dept: OBSTETRICS AND GYNECOLOGY | Facility: HOSPITAL | Age: 29
End: 2024-04-18
Payer: COMMERCIAL

## 2024-04-18 VITALS — SYSTOLIC BLOOD PRESSURE: 110 MMHG | DIASTOLIC BLOOD PRESSURE: 70 MMHG | BODY MASS INDEX: 41.79 KG/M2 | WEIGHT: 214 LBS

## 2024-04-18 VITALS — WEIGHT: 215 LBS | BODY MASS INDEX: 41.99 KG/M2 | DIASTOLIC BLOOD PRESSURE: 69 MMHG | SYSTOLIC BLOOD PRESSURE: 136 MMHG

## 2024-04-18 DIAGNOSIS — Z3A.29 29 WEEKS GESTATION OF PREGNANCY: ICD-10-CM

## 2024-04-18 DIAGNOSIS — B95.1 POSITIVE GBS TEST: ICD-10-CM

## 2024-04-18 DIAGNOSIS — O99.210 OBESITY IN PREGNANCY, ANTEPARTUM: ICD-10-CM

## 2024-04-18 DIAGNOSIS — O24.414 INSULIN CONTROLLED GESTATIONAL DIABETES MELLITUS (GDM) IN THIRD TRIMESTER: Primary | ICD-10-CM

## 2024-04-18 DIAGNOSIS — O36.5990 POOR FETAL GROWTH AFFECTING MANAGEMENT OF MOTHER, ANTEPARTUM, SINGLE OR UNSPECIFIED FETUS: ICD-10-CM

## 2024-04-18 DIAGNOSIS — O35.BXX0 ANOMALY OF HEART OF FETUS AFFECTING PREGNANCY, ANTEPARTUM, SINGLE OR UNSPECIFIED FETUS: ICD-10-CM

## 2024-04-18 DIAGNOSIS — O24.410 DIET CONTROLLED GESTATIONAL DIABETES MELLITUS (GDM), ANTEPARTUM: ICD-10-CM

## 2024-04-18 DIAGNOSIS — O24.410 DIET CONTROLLED GESTATIONAL DIABETES MELLITUS (GDM) IN THIRD TRIMESTER: Primary | ICD-10-CM

## 2024-04-18 DIAGNOSIS — Z34.90 PRENATAL CARE, ANTEPARTUM: ICD-10-CM

## 2024-04-18 DIAGNOSIS — Q21.0 VSD (VENTRICULAR SEPTAL DEFECT): ICD-10-CM

## 2024-04-18 LAB
BILIRUB BLD-MCNC: NEGATIVE MG/DL
CLARITY, POC: CLEAR
COLOR UR: YELLOW
GLUCOSE UR STRIP-MCNC: ABNORMAL MG/DL
GLUCOSE UR STRIP-MCNC: ABNORMAL MG/DL
KETONES UR QL: NEGATIVE
LEUKOCYTE EST, POC: NEGATIVE
NITRITE UR-MCNC: NEGATIVE MG/ML
PH UR: 6 [PH] (ref 5–8)
PROT UR STRIP-MCNC: ABNORMAL MG/DL
PROT UR STRIP-MCNC: ABNORMAL MG/DL
RBC # UR STRIP: NEGATIVE /UL
SP GR UR: 1.02 (ref 1–1.03)
UROBILINOGEN UR QL: ABNORMAL

## 2024-04-18 PROCEDURE — 76819 FETAL BIOPHYS PROFIL W/O NST: CPT

## 2024-04-18 PROCEDURE — 76816 OB US FOLLOW-UP PER FETUS: CPT

## 2024-04-18 NOTE — PROGRESS NOTES
"    Maternal/Fetal Medicine Consult Note     Name: Fareed Garcia    : 1995     MRN: [id number]     Referring Provider: MARIOLA Givens    Chief Complaint  fetal VSD, GDM    Subjective     History of Present Illness:  Fareed Garcia is a 29 y.o.  29w6d who presents today for a follow-up ultrasound to assess interval growth and fetal wellbeing.     Pt denies LOF/VB/ctx's. +FM.     BRANDYN: Estimated Date of Delivery: 24     ROS:   As noted in HPI.     Past Medical History:   Diagnosis Date    Abnormal Pap smear of cervix     ADHD (attention deficit hyperactivity disorder)     Anxiety 2024    Depression 2023    GERD (gastroesophageal reflux disease)     Gestational diabetes mellitus (GDM) 2024      Past Surgical History:   Procedure Laterality Date    BILATERAL BREAST REDUCTION      WISDOM TOOTH EXTRACTION        OB History          2    Para   0    Term   0       0    AB   1    Living   0         SAB   0    IAB   1    Ectopic   0    Molar   0    Multiple   0    Live Births   0          Obstetric Comments   Fob #1 - Pregnancy #1   Fob #2 - Pregnancy #2                Objective     Vital Signs  /69   Wt 97.5 kg (215 lb)   LMP 2023 (Exact Date)   Estimated body mass index is 41.99 kg/m² as calculated from the following:    Height as of 24: 152.4 cm (60\").    Weight as of this encounter: 97.5 kg (215 lb).    Physical Exam  Constitutional:       Appearance: Normal appearance. She is normal weight.   HENT:      Head: Normocephalic and atraumatic.   Pulmonary:      Effort: Pulmonary effort is normal.   Musculoskeletal:         General: Normal range of motion.   Neurological:      General: No focal deficit present.      Mental Status: She is alert and oriented to person, place, and time.   Psychiatric:         Mood and Affect: Mood normal.         Behavior: Behavior normal.         Thought Content: Thought content normal.         Judgment: Judgment normal. "     Ultrasound Impression:   Vtx, S=D, slight accelerated AC, anterior placenta, nl FARRUKH, nl anatomy, still possible very small VSD, BPP 8/8. UA Dopplers wnl.    Assessment and Plan     Diagnoses and all orders for this visit:    1. Insulin controlled gestational diabetes mellitus (GDM) in third trimester (Primary)  Assessment & Plan:  Patient decided after last visit to continue to work on diet and exercise rather than start insulin. Her blood sugars today look excellent. She reports that she has cut out a lot of foods that include added carbs/sugars and that she and her  have started walking at the [facility].     Her blood sugars today are excellent. Her fastings are all <90-95mg/dl and she has only one post-prandial that is at 120mg/dl.     Reviewed ways that patient could manipulate her diet so that she could get a little sweet treat occasionally. She is really doing a phenomenal job managing her blood glucose.     Ultrasound today with slight increase in AC. Overall baby looks excellent. Possibly a small VSD, but very difficult to reproduce reliably.      - Follow-up scheduled in 4wks   - Recommend post-desmond echo    Orders:  -     POC Urinalysis Dipstick    2. VSD (ventricular septal defect)  Assessment & Plan:  Possible very small mid-muscular VSD seen today. Not consistently visualized/reproducible.      - Recommend post-desmond echo      3. Obesity in pregnancy, antepartum    4. 29 weeks gestation of pregnancy         Follow Up  Return in about 4 weeks (around 2024).    I spent 20 minutes caring for the patient on the day of service. This included: obtaining or reviewing a separately obtained medical history, reviewing patient records, performing a medically appropriate exam and/or evaluation, counseling or educating the patient/family/caregiver, ordering medications, labs, and/or procedures and documenting such in the medical record. This does not include time spent on review and interpretation of  other tests such as fetal ultrasound or the performance of other procedures such as amniocentesis or CVS.      Gali Rios MD  04/18/2024

## 2024-04-18 NOTE — PROGRESS NOTES
Patient denies bleeding, leaking fluid or contractions  NIPT declined  Patients next follow up with Dr. Hidalgo's office is today.  Patient is walking twice weekly and is not on insulin at this time and does not want to start insulin.

## 2024-04-18 NOTE — ASSESSMENT & PLAN NOTE
Possible very small mid-muscular VSD seen today. Not consistently visualized/reproducible.      - Recommend post-desmond echo

## 2024-04-18 NOTE — ASSESSMENT & PLAN NOTE
Patient decided after last visit to continue to work on diet and exercise rather than start insulin. Her blood sugars today look excellent. She reports that she has cut out a lot of foods that include added carbs/sugars and that she and her  have started walking at the [facility].     Her blood sugars today are excellent. Her fastings are all <90-95mg/dl and she has only one post-prandial that is at 120mg/dl.     Reviewed ways that patient could manipulate her diet so that she could get a little sweet treat occasionally. She is really doing a phenomenal job managing her blood glucose.     Ultrasound today with slight increase in AC. Overall baby looks excellent. Possibly a small VSD, but very difficult to reproduce reliably.      - Follow-up scheduled in 4wks   - Recommend post-desmond echo

## 2024-04-18 NOTE — PROGRESS NOTES
OB FOLLOW UP  CC- Here for care of pregnancy        Fareed Garcia is a 29 y.o.  29w6d patient being seen today for her obstetrical follow up. Patient reports no complaints. Her blood sugars today are excellent. Her fastings are all <90-95mg/dl and she has only one post-prandial that is at 120mg/dl.         MBT: O+  Rhogam: is not indicated.  28 week packet: reviewed with patient , counseled on fetal movement , pediatrician list reviewed, and childbirth classes reviewed  TDAP: given today  Ultrasound Today: Yes at [facility]. EFW 67%, AC 88%, BPP 8/8, FARRUKH 18.6%, possible small VSD still appreciated. Follow up at [facility] in 4 wks.     Her prenatal care is complicated by (and status) :   Patient Active Problem List   Diagnosis    History of abnormal cervical Pap smear    Prenatal care, antepartum    Depression    Obesity in pregnancy, antepartum    Positive GBS test    Diet controlled gestational diabetes mellitus (GDM) in third trimester    Backache    Anxiety    Fatigue    Joint pain    Fetal cardiac anomaly complicating pregnancy, antepartum         ROS -   Patient Denies: Loss of Fluid, Vaginal Spotting, Vision Changes, Headaches, and Contractions  Fetal Movement : normal    The additional following portions of the patient's history were reviewed and updated as appropriate: allergies, current medications, past family history, past medical history, past social history, past surgical history, and problem list.    I have reviewed and agree with the HPI, ROS, and historical information as entered above. Lilly Hidalgo MD      /70   Wt 97.1 kg (214 lb)   LMP 2023 (Exact Date)   Breastfeeding Unknown   BMI 41.79 kg/m²         EXAM:     Prenatal Vitals  BP: 110/70  Weight: 97.1 kg (214 lb)   Fetal Heart Rate: 141bpm               Urine Glucose Read-only: (!) 1000 mg/dL  Urine Protein Read-only: (!) Trace         Assessment and Plan    Problem List Items Addressed This Visit          Endocrine and  Metabolic    Diet controlled gestational diabetes mellitus (GDM) in third trimester - Primary    Overview     Abnormal 3 hr OGTT @ 14w 4d;   referral to endocrinology 1/3/24  Sends blood sugars to dinorah every week  At 25 weeks and 4 days-Dr. Rios recommended starting insulin and a Dexcom.  At 29 weeks and 6 days-patient reports she never started insulin.  She reports that she has been controlling this with her diet.         Relevant Medications    Insulin Pen Needle (Pen Needles) 31G X 5 MM misc    Continuous Blood Gluc Sensor (Dexcom G7 Sensor) misc    Insulin Glargine (LANTUS SOLOSTAR) 100 UNIT/ML injection pen       Gravid and     Prenatal care, antepartum    Relevant Orders    POC Urinalysis Dipstick (Completed)    CBC (No Diff)    Antibody Screen    RPR    Tdap Vaccine => 6yo IM (BOOSTRIX) (Completed)    Obesity in pregnancy, antepartum    Overview     Hemoglobin A1c at new OB 6.2  Early Glucola 173  Baby aspirin weeks 12 through 36  Growth ultrasound at 32 and 37 weeks  Discussed carbohydrate control, daily exercise, water intake.         Fetal cardiac anomaly complicating pregnancy, antepartum    Overview     Possible VSD seen with PDC.  Will follow-up ultrasound at 34 weeks at [facility].  Recommend fetal echocardiogram after delivery.         RESOLVED: Poor fetal growth affecting management of mother, antepartum    Overview     3/11/2024-AC 9%, EFW 34%.  PDC referral             Other    Positive GBS test    Overview     Seen at urine at new OB visit            Pregnancy at 29w6d  1 hr Glucola, CBC, RPR. Antibody screen and TDAP today  Fetal movement/PTL or Labor precautions  Activity and Exercise discussed.  Return in about 2 weeks (around 2024) for nst.    Blood sugar today 103.    Lilly Hidalgo MD  2024

## 2024-04-19 ENCOUNTER — TELEPHONE (OUTPATIENT)
Dept: OBSTETRICS AND GYNECOLOGY | Facility: CLINIC | Age: 29
End: 2024-04-19

## 2024-04-19 LAB
BLD GP AB SCN SERPL QL: NEGATIVE
ERYTHROCYTE [DISTWIDTH] IN BLOOD BY AUTOMATED COUNT: 13 % (ref 12.3–15.4)
HCT VFR BLD AUTO: 32.3 % (ref 34–46.6)
HGB BLD-MCNC: 10.7 G/DL (ref 12–15.9)
MCH RBC QN AUTO: 28.6 PG (ref 26.6–33)
MCHC RBC AUTO-ENTMCNC: 33.1 G/DL (ref 31.5–35.7)
MCV RBC AUTO: 86.4 FL (ref 79–97)
PLATELET # BLD AUTO: 192 10*3/MM3 (ref 140–450)
RBC # BLD AUTO: 3.74 10*6/MM3 (ref 3.77–5.28)
RPR SER QL: NON REACTIVE
WBC # BLD AUTO: 10.05 10*3/MM3 (ref 3.4–10.8)

## 2024-04-19 NOTE — TELEPHONE ENCOUNTER
Provider: DR SPARROW     Caller: VERONA MORSE     Relationship to Patient: SELF         Reason for Call: PT HAS QUESTIONS ABOUT HER INDUCTION AND WOULD LIKE TO SPEAK TO A NURSE PLEASE CALL

## 2024-04-19 NOTE — TELEPHONE ENCOUNTER
Rocky pt. 30w0d. Currently IOL is scheduled for 6/23/24. After looking at calendar pt requesting induction be moved to 6/24/24 instead. Told pt I will forward her message to Dr. Hidalgo and her nurse to review when Dr. Hidalgo returns next week.

## 2024-04-19 NOTE — TELEPHONE ENCOUNTER
Called pt and informed her of [facility] schedule on 6/25. Discussed induction and delivery next day. Pt would like to keep IOL as scheduled for now and will talk to Rocky about it at her next visit.

## 2024-04-22 PROBLEM — O99.019 MATERNAL ANEMIA IN PREGNANCY, ANTEPARTUM: Status: ACTIVE | Noted: 2024-04-22

## 2024-05-01 ENCOUNTER — TELEPHONE (OUTPATIENT)
Dept: OBSTETRICS AND GYNECOLOGY | Facility: HOSPITAL | Age: 29
End: 2024-05-01
Payer: COMMERCIAL

## 2024-05-01 NOTE — TELEPHONE ENCOUNTER
Attempted to reach patient by phone, message left requesting patient send in her blood sugar log today.  [employer] message sent on 4/30/24 requesting she send in her blood sugar log ahs not been read.  Tianna Rios RN

## 2024-05-02 ENCOUNTER — ROUTINE PRENATAL (OUTPATIENT)
Dept: OBSTETRICS AND GYNECOLOGY | Facility: CLINIC | Age: 29
End: 2024-05-02
Payer: COMMERCIAL

## 2024-05-02 VITALS — BODY MASS INDEX: 42.38 KG/M2 | WEIGHT: 217 LBS | SYSTOLIC BLOOD PRESSURE: 110 MMHG | DIASTOLIC BLOOD PRESSURE: 72 MMHG

## 2024-05-02 DIAGNOSIS — O24.410 DIET CONTROLLED GESTATIONAL DIABETES MELLITUS (GDM) IN THIRD TRIMESTER: Primary | ICD-10-CM

## 2024-05-02 DIAGNOSIS — O35.BXX0 ANOMALY OF HEART OF FETUS AFFECTING PREGNANCY, ANTEPARTUM, SINGLE OR UNSPECIFIED FETUS: ICD-10-CM

## 2024-05-02 DIAGNOSIS — B95.1 POSITIVE GBS TEST: ICD-10-CM

## 2024-05-02 DIAGNOSIS — O99.019 MATERNAL ANEMIA IN PREGNANCY, ANTEPARTUM: ICD-10-CM

## 2024-05-02 DIAGNOSIS — Z34.90 PRENATAL CARE, ANTEPARTUM: ICD-10-CM

## 2024-05-02 DIAGNOSIS — O99.210 OBESITY IN PREGNANCY, ANTEPARTUM: ICD-10-CM

## 2024-05-02 LAB
GLUCOSE UR STRIP-MCNC: NEGATIVE MG/DL
PROT UR STRIP-MCNC: NEGATIVE MG/DL

## 2024-05-02 NOTE — PROGRESS NOTES
OB FOLLOW UP  CC- Here for care of pregnancy        Fareed Garcia is a 29 y.o.  31w6d patient being seen today for her obstetrical follow up visit. Patient reports no complaints. Her fasting BG has ranged from 83-95 which average of 90. 2 hr PP has ranged [id number] with average in the 110s. She is still not requiring insulin.    Her prenatal care is complicated by (and status) :    Patient Active Problem List   Diagnosis    History of abnormal cervical Pap smear    Prenatal care, antepartum    Depression    Obesity in pregnancy, antepartum    Positive GBS test    Diet controlled gestational diabetes mellitus (GDM) in third trimester    Backache    Anxiety    Fatigue    Joint pain    Fetal cardiac anomaly complicating pregnancy, antepartum    Maternal anemia in pregnancy, antepartum     TDAP status: received at last visit  Rhogam status: was not indicated  28 week labs: Reviewed and Labs show anemia. She is not taking additional iron supplement.  Ultrasound Today: No  Non Stress Test: Yes minutes 40  non-stress test: NST: Reactive  indication: Diabetes Mellitus Gestational  category: Category I      ROS -   Patient Denies: Loss of Fluid, Vaginal Spotting, Vision Changes, Headaches, and Contractions  Fetal Movement : normal  All other systems reviewed and are negative.       The additional following portions of the patient's history were reviewed and updated as appropriate: allergies, current medications, past family history, past medical history, past social history, past surgical history, and problem list.    I have reviewed and agree with the HPI, ROS, and historical information as entered above. Lilly Hidalgo MD      /72   Wt 98.4 kg (217 lb)   LMP 2023 (Exact Date)   BMI 42.38 kg/m²         EXAM:     Prenatal Vitals  BP: 110/72  Weight: 98.4 kg (217 lb)   Fetal Heart Rate: NST               Urine Glucose Read-only: Negative  Urine Protein Read-only: Negative            Assessment and Plan    Problem List Items Addressed This Visit          Endocrine and Metabolic    Diet controlled gestational diabetes mellitus (GDM) in third trimester - Primary    Overview     Abnormal 3 hr OGTT @ 14w 4d;   referral to endocrinology 1/3/24  Sends blood sugars to dinorah every week  At 25 weeks and 4 days-Dr. Rios recommended starting insulin and a Dexcom.  At 29 weeks and 6 days-patient reports she never started insulin.  She reports that she has been controlling this with her diet.  2024-at 31 weeks and 6 days-patient compliant with sending in blood sugars.  Good control with diet alone.         Relevant Medications    Insulin Pen Needle (Pen Needles) 31G X 5 MM misc    Continuous Blood Gluc Sensor (Dexcom G7 Sensor) misc    Insulin Glargine (LANTUS SOLOSTAR) 100 UNIT/ML injection pen       Gravid and     Prenatal care, antepartum    Relevant Orders    POC Urinalysis Dipstick (Completed)    Obesity in pregnancy, antepartum    Overview     Hemoglobin A1c at new OB 6.2  Early Glucola 173  Baby aspirin weeks 12 through 36  Growth ultrasound at 32 and 37 weeks  Discussed carbohydrate control, daily exercise, water intake.         Fetal cardiac anomaly complicating pregnancy, antepartum    Overview     Possible VSD seen with PD[facility].  Will follow-up ultrasound at 34 weeks at [facility].  Recommend fetal echocardiogram after delivery.            Hematology and Neoplasia    Maternal anemia in pregnancy, antepartum    Overview     Hemoglobin 10.7 at 30 weeks.  Advised iron supplementation            Other    Positive GBS test    Overview     Seen at urine at new OB visit            Pregnancy at 31w6d  Fetal status reassuring.  Reassurance.  Patient been compliant with her blood sugars.  28 week labs reviewed.    Activity and Exercise discussed.  Fetal movement/PTL or Labor precautions  Return in about 2 weeks (around 2024).    Lilly Hidalgo MD  2024

## 2024-05-06 ENCOUNTER — TELEPHONE (OUTPATIENT)
Dept: OBSTETRICS AND GYNECOLOGY | Facility: HOSPITAL | Age: 29
End: 2024-05-06
Payer: COMMERCIAL

## 2024-05-13 ENCOUNTER — ROUTINE PRENATAL (OUTPATIENT)
Dept: OBSTETRICS AND GYNECOLOGY | Facility: CLINIC | Age: 29
End: 2024-05-13
Payer: COMMERCIAL

## 2024-05-13 VITALS — BODY MASS INDEX: 42.4 KG/M2 | SYSTOLIC BLOOD PRESSURE: 102 MMHG | WEIGHT: 217.1 LBS | DIASTOLIC BLOOD PRESSURE: 70 MMHG

## 2024-05-13 DIAGNOSIS — O99.210 OBESITY IN PREGNANCY, ANTEPARTUM: ICD-10-CM

## 2024-05-13 DIAGNOSIS — O24.410 DIET CONTROLLED GESTATIONAL DIABETES MELLITUS (GDM) IN THIRD TRIMESTER: ICD-10-CM

## 2024-05-13 DIAGNOSIS — R81 GLUCOSURIA: ICD-10-CM

## 2024-05-13 DIAGNOSIS — O35.BXX0 ANOMALY OF HEART OF FETUS AFFECTING PREGNANCY, ANTEPARTUM, SINGLE OR UNSPECIFIED FETUS: ICD-10-CM

## 2024-05-13 DIAGNOSIS — O99.019 MATERNAL ANEMIA IN PREGNANCY, ANTEPARTUM: ICD-10-CM

## 2024-05-13 DIAGNOSIS — B95.1 POSITIVE GBS TEST: ICD-10-CM

## 2024-05-13 DIAGNOSIS — Z34.90 PRENATAL CARE, ANTEPARTUM: Primary | ICD-10-CM

## 2024-05-13 DIAGNOSIS — Z87.42 HISTORY OF ABNORMAL CERVICAL PAP SMEAR: ICD-10-CM

## 2024-05-13 LAB
GLUCOSE BLDC GLUCOMTR-MCNC: 92 MG/DL (ref 70–130)
GLUCOSE UR STRIP-MCNC: ABNORMAL MG/DL
PROT UR STRIP-MCNC: NEGATIVE MG/DL

## 2024-05-13 PROCEDURE — 82947 ASSAY GLUCOSE BLOOD QUANT: CPT | Performed by: OBSTETRICS & GYNECOLOGY

## 2024-05-13 PROCEDURE — 0502F SUBSEQUENT PRENATAL CARE: CPT | Performed by: OBSTETRICS & GYNECOLOGY

## 2024-05-13 RX ORDER — FERROUS SULFATE 325(65) MG
325 TABLET ORAL
COMMUNITY

## 2024-05-13 NOTE — PROGRESS NOTES
OB FOLLOW UP  CC- Here for care of pregnancy        Fareed Garcia is a 29 y.o.  33w3d patient being seen today for her obstetrical follow up visit. Patient reports her average fasting BG is 80-94. Her average 2hr PP BG is 109-120. Managed with diet-controlled. Pt reports feeling like she is having vaginal pressure and sometimes feels the urge to bare down like having a BM. No contractions felt.     Pt. Had trace glucosuria today. FSBG in office 92, reports last eating aprx 1 hour ago.      Her prenatal care is complicated by (and status) :   Patient Active Problem List   Diagnosis    History of abnormal cervical Pap smear    Prenatal care, antepartum    Depression    Obesity in pregnancy, antepartum    Positive GBS test    Diet controlled gestational diabetes mellitus (GDM) in third trimester    Backache    Anxiety    Fatigue    Joint pain    Fetal cardiac anomaly complicating pregnancy, antepartum    Maternal anemia in pregnancy, antepartum       Ultrasound Today: No  Non Stress Test: No.    ROS -   Patient Denies: Loss of Fluid, Vaginal Spotting, Vision Changes, Headaches, Nausea , Vomiting , Contractions, Epigastric pain, and skin itching  Fetal Movement : normal  All other systems reviewed and are negative.       The additional following portions of the patient's history were reviewed and updated as appropriate: allergies, current medications, past family history, past medical history, past social history, past surgical history, and problem list.    I have reviewed and agree with the HPI, ROS, and historical information as entered above. Lilly Hidalgo MD      /70   Wt 98.5 kg (217 lb 1.6 oz)   LMP 2023 (Exact Date)   BMI 42.40 kg/m²       EXAM:     Prenatal Vitals  BP: 102/70  Weight: 98.5 kg (217 lb 1.6 oz)   Fetal Heart Rate: 145      Fundal Height (cm): 34 cm        Urine Glucose Read-only: (!) Trace  Urine Protein Read-only: Negative           Assessment and Plan    Problem  List Items Addressed This Visit          Endocrine and Metabolic    Diet controlled gestational diabetes mellitus (GDM) in third trimester    Overview     Abnormal 3 hr OGTT @ 14w 4d;   referral to endocrinology 1/3/24  Sends blood sugars to dinorah every week  At 25 weeks and 4 days-Dr. Rios recommended starting insulin and a Dexcom.  At 29 weeks and 6 days-patient reports she never started insulin.  She reports that she has been controlling this with her diet.  2024-at 31 weeks and 6 days-patient compliant with sending in blood sugars.  Good control with diet alone.         Relevant Medications    Insulin Pen Needle (Pen Needles) 31G X 5 MM misc    Continuous Blood Gluc Sensor (Dexcom G7 Sensor) misc    Insulin Glargine (LANTUS SOLOSTAR) 100 UNIT/ML injection pen       Genitourinary and Reproductive     History of abnormal cervical Pap smear    Overview     2022-ASCUS.  HPV negative.  Recommend follow-up in 1 year.            Gravid and     Prenatal care, antepartum - Primary    Overview     IOL  at 9 PM         Relevant Orders    POC Urinalysis Dipstick (Completed)    Obesity in pregnancy, antepartum    Overview     Hemoglobin A1c at new OB 6.2  Early Glucola 173  Baby aspirin weeks 12 through 36  Growth ultrasound at 32 and 37 weeks  Discussed carbohydrate control, daily exercise, water intake.         Fetal cardiac anomaly complicating pregnancy, antepartum    Overview     Possible VSD seen with [facility].  Will follow-up ultrasound at 34 weeks at [facility].  Recommend fetal echocardiogram after delivery.            Hematology and Neoplasia    Maternal anemia in pregnancy, antepartum    Overview     Hemoglobin 10.7 at 30 weeks.  Advised iron supplementation         Relevant Medications    ferrous sulfate 325 (65 FE) MG tablet       Other    Positive GBS test    Overview     Seen at urine at new OB visit          Other Visit Diagnoses       Glucosuria        Relevant Orders    POC Glucose, Blood  (Completed)            Pregnancy at 33w3d  Fetal status reassuring.   Activity and Exercise discussed.  Fetal movement/PTL or Labor precautions  Return in about 1 week (around 5/20/2024) for PDC same day.    Lilly Hidalgo MD  05/13/2024

## 2024-05-14 ENCOUNTER — TELEPHONE (OUTPATIENT)
Dept: OBSTETRICS AND GYNECOLOGY | Facility: CLINIC | Age: 29
End: 2024-05-14
Payer: COMMERCIAL

## 2024-05-15 ENCOUNTER — TELEPHONE (OUTPATIENT)
Dept: OBSTETRICS AND GYNECOLOGY | Facility: HOSPITAL | Age: 29
End: 2024-05-15
Payer: COMMERCIAL

## 2024-05-15 NOTE — TELEPHONE ENCOUNTER
Attempted to reach patient by phone, received voice mail.  Message left stating Dr. Rios has reviewed her blood sugar log and states Overall good!  No changes.  Tianna Rios RN

## 2024-05-20 ENCOUNTER — ROUTINE PRENATAL (OUTPATIENT)
Dept: OBSTETRICS AND GYNECOLOGY | Facility: CLINIC | Age: 29
End: 2024-05-20
Payer: MEDICAID

## 2024-05-20 ENCOUNTER — OFFICE VISIT (OUTPATIENT)
Dept: OBSTETRICS AND GYNECOLOGY | Facility: HOSPITAL | Age: 29
End: 2024-05-20
Payer: MEDICAID

## 2024-05-20 ENCOUNTER — HOSPITAL ENCOUNTER (OUTPATIENT)
Dept: WOMENS IMAGING | Facility: HOSPITAL | Age: 29
Discharge: HOME | End: 2024-05-20
Admitting: OBSTETRICS & GYNECOLOGY
Payer: COMMERCIAL

## 2024-05-20 VITALS — DIASTOLIC BLOOD PRESSURE: 60 MMHG | SYSTOLIC BLOOD PRESSURE: 112 MMHG | WEIGHT: 217 LBS | BODY MASS INDEX: 42.38 KG/M2

## 2024-05-20 VITALS — BODY MASS INDEX: 42.5 KG/M2 | WEIGHT: 217.6 LBS | DIASTOLIC BLOOD PRESSURE: 72 MMHG | SYSTOLIC BLOOD PRESSURE: 106 MMHG

## 2024-05-20 DIAGNOSIS — O99.210 OBESITY IN PREGNANCY, ANTEPARTUM: ICD-10-CM

## 2024-05-20 DIAGNOSIS — O24.410 DIET CONTROLLED GESTATIONAL DIABETES MELLITUS (GDM) IN THIRD TRIMESTER: ICD-10-CM

## 2024-05-20 DIAGNOSIS — B95.1 POSITIVE GBS TEST: ICD-10-CM

## 2024-05-20 DIAGNOSIS — O35.BXX0 ANOMALY OF HEART OF FETUS AFFECTING PREGNANCY, ANTEPARTUM, SINGLE OR UNSPECIFIED FETUS: ICD-10-CM

## 2024-05-20 DIAGNOSIS — O99.019 MATERNAL ANEMIA IN PREGNANCY, ANTEPARTUM: ICD-10-CM

## 2024-05-20 DIAGNOSIS — Z3A.34 34 WEEKS GESTATION OF PREGNANCY: ICD-10-CM

## 2024-05-20 DIAGNOSIS — O24.414 INSULIN CONTROLLED GESTATIONAL DIABETES MELLITUS (GDM) IN THIRD TRIMESTER: ICD-10-CM

## 2024-05-20 DIAGNOSIS — Q21.0 VSD (VENTRICULAR SEPTAL DEFECT): ICD-10-CM

## 2024-05-20 DIAGNOSIS — Z34.90 PRENATAL CARE, ANTEPARTUM: Primary | ICD-10-CM

## 2024-05-20 LAB
BILIRUB BLD-MCNC: NEGATIVE MG/DL
CLARITY, POC: CLEAR
COLOR UR: ABNORMAL
GLUCOSE UR STRIP-MCNC: NEGATIVE MG/DL
GLUCOSE UR STRIP-MCNC: NEGATIVE MG/DL
KETONES UR QL: NEGATIVE
LEUKOCYTE EST, POC: NEGATIVE
NITRITE UR-MCNC: NEGATIVE MG/ML
PH UR: 6 [PH] (ref 5–8)
PROT UR STRIP-MCNC: ABNORMAL MG/DL
PROT UR STRIP-MCNC: ABNORMAL MG/DL
RBC # UR STRIP: NEGATIVE /UL
SP GR UR: 1.02 (ref 1–1.03)
UROBILINOGEN UR QL: NORMAL

## 2024-05-20 PROCEDURE — 76819 FETAL BIOPHYS PROFIL W/O NST: CPT

## 2024-05-20 PROCEDURE — 76816 OB US FOLLOW-UP PER FETUS: CPT

## 2024-05-20 PROCEDURE — 99214 OFFICE O/P EST MOD 30 MIN: CPT | Performed by: OBSTETRICS & GYNECOLOGY

## 2024-05-20 PROCEDURE — 76820 UMBILICAL ARTERY ECHO: CPT

## 2024-05-20 NOTE — LETTER
"May 29, 2024     MARIOLA Givens  [address]  [place] [address]    Patient: Fareed Garcia   YOB: 1995   Date of Visit: 2024       Dear MARIOLA Givens,    Thank you for referring Fareed Garcia to me for evaluation. Below is a copy of my consult note.    If you have questions, please do not hesitate to call me. I look forward to following Fareed along with you.         Sincerely,        Gali Rios MD        CC: Lilly Hidalgo MD                      Maternal/Fetal Medicine Consult Note     Name: Fareed Garcia    : 1995     MRN: [id number]     Referring Provider: MARIOLA Givens    Chief Complaint  GDM    Subjective     History of Present Illness:  Fareed Garcia is a 29 y.o.  34w3d who presents today for a follow-up ultrasound to assess growth and fetal wellbeing.     Pt denies LOF/VB/ctx's. +FM.    BRANDYN: Estimated Date of Delivery: 24     ROS:   As noted in HPI.     Past Medical History:   Diagnosis Date   • Abnormal Pap smear of cervix    • ADHD (attention deficit hyperactivity disorder)    • Anxiety 2024   • Depression 2023   • GERD (gastroesophageal reflux disease)    • Gestational diabetes mellitus (GDM) 2024      Past Surgical History:   Procedure Laterality Date   • BILATERAL BREAST REDUCTION     • WISDOM TOOTH EXTRACTION        OB History          2    Para   0    Term   0       0    AB   1    Living   0         SAB   0    IAB   1    Ectopic   0    Molar   0    Multiple   0    Live Births   0          Obstetric Comments   Fob #1 - Pregnancy #1   Fob #2 - Pregnancy #2                Objective     Vital Signs  /60   Wt 98.4 kg (217 lb)   LMP 2023 (Exact Date)   Estimated body mass index is 42.38 kg/m² as calculated from the following:    Height as of 24: 152.4 cm (60\").    Weight as of this encounter: 98.4 kg (217 lb).    Physical Exam  Constitutional:       Appearance: " Normal appearance. She is obese.   HENT:      Head: Normocephalic and atraumatic.   Pulmonary:      Effort: Pulmonary effort is normal.   Musculoskeletal:         General: Normal range of motion.   Neurological:      General: No focal deficit present.      Mental Status: She is alert and oriented to person, place, and time.   Psychiatric:         Mood and Affect: Mood normal.         Behavior: Behavior normal.         Thought Content: Thought content normal.         Judgment: Judgment normal.     Ultrasound Impression:   Vtx, EFW 60th% 2633gms, AC 87th%, FARRUKH 21cms, anterior placenta, nl anatomy, BPP 8/8. UA Dopplers wnl.    Assessment and Plan     Diagnoses and all orders for this visit:    1. Prenatal care, antepartum (Primary)  -     POC Urinalysis Dipstick    2. Diet controlled gestational diabetes mellitus (GDM) in third trimester  Assessment & Plan:  Patient has continued to do an excellent job of managing her blood glucose with diet and exercise alone.     Growth today is stable and within normal limits at 60th percentile or 2633gms with AC only slightly ahead at the 87th percentile. There is a normal amount of amniotic fluid. Both BPP and UA Dopplers are very reassuring.     VSD not visualized today     - Follow-up scheduled in 4wks    Orders:  -     [facility]; Future    3. Anomaly of heart of fetus affecting pregnancy, antepartum, single or unspecified fetus  Assessment & Plan:  VSD not visualized today. Would still recommend post- echo to confirm.    Orders:  -     [facility]; Future    4. Obesity in pregnancy, antepartum  -     [facility]; Future    5. 34 weeks gestation of pregnancy         Follow Up  Return in about 4 weeks (around 2024).    I spent 10 minutes caring for the patient on the day of service. This included: obtaining or reviewing a separately obtained medical history, reviewing patient records, performing a  medically appropriate exam and/or evaluation, counseling or educating the patient/family/caregiver, ordering medications, labs, and/or procedures and documenting such in the medical record. This does not include time spent on review and interpretation of other tests such as fetal ultrasound or the performance of other procedures such as amniocentesis or CVS.      Gali Rios MD  05/20/2024

## 2024-05-20 NOTE — PROGRESS NOTES
OB FOLLOW UP  CC- Here for care of pregnancy        Fareed Garcia is a 29 y.o.  34w3d patient being seen today for her obstetrical follow up visit. Patient reports no complaints.     Her prenatal care is complicated by (and status) : see below. and GDM diet controlled. Her average fasting BG is 92. Her average 2hr PP BG is 114  Patient Active Problem List   Diagnosis    History of abnormal cervical Pap smear    Prenatal care, antepartum    Depression    Obesity in pregnancy, antepartum    Positive GBS test    Diet controlled gestational diabetes mellitus (GDM) in third trimester    Backache    Anxiety    Fatigue    Joint pain    Fetal cardiac anomaly complicating pregnancy, antepartum    Maternal anemia in pregnancy, antepartum         Ultrasound Today: Yes @ [facility], pending.   Non Stress Test: No.    ROS -   Patient Denies: Loss of Fluid, Vaginal Spotting, Vision Changes, Headaches, Contractions, Epigastric pain, and skin itching  Fetal Movement : normal  All other systems reviewed and are negative.       The additional following portions of the patient's history were reviewed and updated as appropriate: allergies and current medications.    I have reviewed and agree with the HPI, ROS, and historical information as entered above. Lilly Hidalgo MD      /72   Wt 98.7 kg (217 lb 9.6 oz)   LMP 2023 (Exact Date)   BMI 42.50 kg/m²       EXAM:     Prenatal Vitals  BP: 106/72  Weight: 98.7 kg (217 lb 9.6 oz)   Fetal Heart Rate: usg               Urine Glucose Read-only: Negative  Urine Protein Read-only: (!) 1+           Assessment and Plan    Problem List Items Addressed This Visit          Endocrine and Metabolic    Diet controlled gestational diabetes mellitus (GDM) in third trimester    Overview     Abnormal 3 hr OGTT @ 14w 4d;   referral to endocrinology 1/3/24  Sends blood sugars to [facility] every week  At 25 weeks and 4 days-Dr. Rios recommended starting insulin and a  Dexcom.  At 29 weeks and 6 days-patient reports she never started insulin.  She reports that she has been controlling this with her diet.  2024-at 31 weeks and 6 days-patient compliant with sending in blood sugars.  Good control with diet alone.         Relevant Medications    Insulin Pen Needle (Pen Needles) 31G X 5 MM misc    Continuous Blood Gluc Sensor (Dexcom G7 Sensor) misc    Insulin Glargine (LANTUS SOLOSTAR) 100 UNIT/ML injection pen       Gravid and     Prenatal care, antepartum - Primary    Overview     IOL  at 9 PM         Relevant Orders    POC Urinalysis Dipstick (Completed)    Obesity in pregnancy, antepartum    Overview     Hemoglobin A1c at new OB 6.2  Early Glucola 173  Baby aspirin weeks 12 through 36  Growth ultrasound at 32 and 37 weeks  Discussed carbohydrate control, daily exercise, water intake.         Fetal cardiac anomaly complicating pregnancy, antepartum    Overview     Possible VSD seen with [facility].    Not seen at 34 weeks.  They recommend follow-up at 38 weeks.  Next follow-up on     Recommend fetal echocardiogram after delivery.            Hematology and Neoplasia    Maternal anemia in pregnancy, antepartum    Overview     Hemoglobin 10.7 at 30 weeks.  Advised iron supplementation         Relevant Medications    ferrous sulfate 325 (65 FE) MG tablet       Other    Positive GBS test    Overview     Seen at urine at new OB visit            Pregnancy at 34w3d  Fetal status reassuring.   Report from [facility] still pending.  She follows up in 4 weeks.  Activity and Exercise discussed.  Fetal movement/PTL or Labor precautions  GBS next visit  Return in about 2 weeks (around 6/3/2024).    Lilly Hidalgo MD  2024

## 2024-05-22 ENCOUNTER — TELEPHONE (OUTPATIENT)
Dept: OBSTETRICS AND GYNECOLOGY | Facility: CLINIC | Age: 29
End: 2024-05-22

## 2024-05-22 ENCOUNTER — TELEPHONE (OUTPATIENT)
Dept: OBSTETRICS AND GYNECOLOGY | Facility: HOSPITAL | Age: 29
End: 2024-05-22
Payer: MEDICAID

## 2024-05-22 NOTE — TELEPHONE ENCOUNTER
Spoke with patient over the phone.  Requested patient send in her blood sugar log .  Patient is agreeable.  Tianna Rios RN

## 2024-05-22 NOTE — TELEPHONE ENCOUNTER
Caller: Fareed Garcia    Relationship to patient: Self    Best call back number: [phone] (home)  CAN CALL BACK AND LVM    WOULD LIKE A CALL BACK ABOUT FMLA.  THANK YOU.

## 2024-05-29 NOTE — ASSESSMENT & PLAN NOTE
Patient has continued to do an excellent job of managing her blood glucose with diet and exercise alone.     Growth today is stable and within normal limits at 60th percentile or 2633gms with AC only slightly ahead at the 87th percentile. There is a normal amount of amniotic fluid. Both BPP and UA Dopplers are very reassuring.     VSD not visualized today     - Follow-up scheduled in 4wks

## 2024-05-29 NOTE — PROGRESS NOTES
"    Maternal/Fetal Medicine Consult Note     Name: Fareed Garcia    : 1995     MRN: [id number]     Referring Provider: MARIOLA Givens    Chief Complaint  GDM    Subjective     History of Present Illness:  Fareed Garcia is a 29 y.o.  34w3d who presents today for a follow-up ultrasound to assess growth and fetal wellbeing.     Pt denies LOF/VB/ctx's. +FM.    BRANDYN: Estimated Date of Delivery: 24     ROS:   As noted in HPI.     Past Medical History:   Diagnosis Date    Abnormal Pap smear of cervix     ADHD (attention deficit hyperactivity disorder)     Anxiety 2024    Depression 2023    GERD (gastroesophageal reflux disease)     Gestational diabetes mellitus (GDM) 2024      Past Surgical History:   Procedure Laterality Date    BILATERAL BREAST REDUCTION      WISDOM TOOTH EXTRACTION        OB History          2    Para   0    Term   0       0    AB   1    Living   0         SAB   0    IAB   1    Ectopic   0    Molar   0    Multiple   0    Live Births   0          Obstetric Comments   Fob #1 - Pregnancy #1   Fob #2 - Pregnancy #2                Objective     Vital Signs  /60   Wt 98.4 kg (217 lb)   LMP 2023 (Exact Date)   Estimated body mass index is 42.38 kg/m² as calculated from the following:    Height as of 24: 152.4 cm (60\").    Weight as of this encounter: 98.4 kg (217 lb).    Physical Exam  Constitutional:       Appearance: Normal appearance. She is obese.   HENT:      Head: Normocephalic and atraumatic.   Pulmonary:      Effort: Pulmonary effort is normal.   Musculoskeletal:         General: Normal range of motion.   Neurological:      General: No focal deficit present.      Mental Status: She is alert and oriented to person, place, and time.   Psychiatric:         Mood and Affect: Mood normal.         Behavior: Behavior normal.         Thought Content: Thought content normal.         Judgment: Judgment normal.     Ultrasound Impression: "   Vtx, EFW 60th% 2633gms, AC 87th%, FARRUKH 21cms, anterior placenta, nl anatomy, BPP 8/8. UA Dopplers wnl.    Assessment and Plan     Diagnoses and all orders for this visit:    1. Prenatal care, antepartum (Primary)  -     POC Urinalysis Dipstick    2. Diet controlled gestational diabetes mellitus (GDM) in third trimester  Assessment & Plan:  Patient has continued to do an excellent job of managing her blood glucose with diet and exercise alone.     Growth today is stable and within normal limits at 60th percentile or 2633gms with AC only slightly ahead at the 87th percentile. There is a normal amount of amniotic fluid. Both BPP and UA Dopplers are very reassuring.     VSD not visualized today     - Follow-up scheduled in 4wks    Orders:  -      [facility]; Future    3. Anomaly of heart of fetus affecting pregnancy, antepartum, single or unspecified fetus  Assessment & Plan:  VSD not visualized today. Would still recommend post- echo to confirm.    Orders:  -     [facility]; Future    4. Obesity in pregnancy, antepartum  -     [facility]; Future    5. 34 weeks gestation of pregnancy         Follow Up  Return in about 4 weeks (around 2024).    I spent 10 minutes caring for the patient on the day of service. This included: obtaining or reviewing a separately obtained medical history, reviewing patient records, performing a medically appropriate exam and/or evaluation, counseling or educating the patient/family/caregiver, ordering medications, labs, and/or procedures and documenting such in the medical record. This does not include time spent on review and interpretation of other tests such as fetal ultrasound or the performance of other procedures such as amniocentesis or CVS.      Gali Rios MD  2024

## 2024-06-03 ENCOUNTER — TELEPHONE (OUTPATIENT)
Dept: OBSTETRICS AND GYNECOLOGY | Facility: CLINIC | Age: 29
End: 2024-06-03

## 2024-06-03 ENCOUNTER — ROUTINE PRENATAL (OUTPATIENT)
Dept: OBSTETRICS AND GYNECOLOGY | Facility: CLINIC | Age: 29
End: 2024-06-03
Payer: COMMERCIAL

## 2024-06-03 VITALS — BODY MASS INDEX: 43.59 KG/M2 | SYSTOLIC BLOOD PRESSURE: 122 MMHG | DIASTOLIC BLOOD PRESSURE: 78 MMHG | WEIGHT: 223.2 LBS

## 2024-06-03 DIAGNOSIS — Z34.90 PRENATAL CARE, ANTEPARTUM: Primary | ICD-10-CM

## 2024-06-03 DIAGNOSIS — O24.410 DIET CONTROLLED GESTATIONAL DIABETES MELLITUS (GDM) IN THIRD TRIMESTER: ICD-10-CM

## 2024-06-03 LAB
GLUCOSE UR STRIP-MCNC: NEGATIVE MG/DL
PROT UR STRIP-MCNC: NEGATIVE MG/DL

## 2024-06-03 PROCEDURE — 0502F SUBSEQUENT PRENATAL CARE: CPT

## 2024-06-03 NOTE — PROGRESS NOTES
OB FOLLOW UP  CC- Here for care of pregnancy        Fareed Garcia is a 29 y.o.  36w3d patient being seen today for her obstetrical follow up visit. Patient reports increased vaginal discharge that looks mucusy and clear. She reports BH contractions that ease up when she gets up and starts walking.   Her prenatal care is complicated by (and status) : GDM diet controlled. Her fasting BG range has been 90-96. Her 2hr PP BG range has been 100-121.  Patient Active Problem List   Diagnosis    History of abnormal cervical Pap smear    Prenatal care, antepartum    Depression    Obesity in pregnancy, antepartum    Positive GBS test    Diet controlled gestational diabetes mellitus (GDM) in third trimester    Backache    Anxiety    Fatigue    Joint pain    Fetal cardiac anomaly complicating pregnancy, antepartum    Maternal anemia in pregnancy, antepartum       GBS Status: was positive in urine.    Allergies   Allergen Reactions    Coconut (Cocos Nucifera) Hives and Unknown - Low Severity          Her Delivery Plan is: [facility] IOL at 39wks. Scheduled    US today: no  Non Stress Test: No.    ROS -   Patient Denies: Loss of Fluid, Vaginal Spotting, Vision Changes, Headaches, Nausea , Vomiting , Epigastric pain, and skin itching  Fetal Movement : normal  All other systems reviewed and are negative.       The additional following portions of the patient's history were reviewed and updated as appropriate: allergies, current medications, past family history, past medical history, past social history, past surgical history, and problem list.    I have reviewed and agree with the HPI, ROS, and historical information as entered above. Ariella Roger, APRN        EXAM:     Prenatal Vitals  BP: 122/78  Weight: 101 kg (223 lb 3.2 oz)   Fetal Heart Rate: 140       Dilation/Effacement/Station  Dilation: 2  Effacement (%): 70  Station: -2      Urine Glucose Read-only: Negative  Urine Protein Read-only: Negative           Assessment  and Plan    Problem List Items Addressed This Visit          Endocrine and Metabolic    Diet controlled gestational diabetes mellitus (GDM) in third trimester    Overview     Abnormal 3 hr OGTT @ 14w 4d;   referral to endocrinology 1/3/24  S[facility] blood sugars to [facility] every week  At 25 weeks and 4 days-Dr. Rios recommended starting insulin and a Dexcom.  At 29 weeks and 6 days-patient reports she never started insulin.  She reports that she has been controlling this with her diet.  2024-at 31 weeks and 6 days-patient compliant with sending in blood sugars.  Good control with diet alone.            Gravid and     Prenatal care, antepartum - Primary    Overview     IOL  at 9 PM         Relevant Orders    POC Urinalysis Dipstick (Completed)       Pregnancy at 36w3d  Fetal status reassuring.   Reviewed Pre-eclampsia signs/symptoms  Reviewed upcoming IOL with patient. Instructions given.  Delivery options reviewed with patient  Signs of labor reviewed  Kick counts reviewed  Activity and Exercise discussed.  Follow up in one week    MARIOLA Sneed  2024

## 2024-06-03 NOTE — TELEPHONE ENCOUNTER
[facility] received a fax that requires your attention. The document has been indexed to the patient’s chart for your review.      Reason for sending:  FOR PROVIDER REVIEW, TO SIGN AND FAX BACK.     Documents Description:  EXT MED REC_ [facility]_ 05-28-24    Name of Sender: [facility]    Date Indexed: 06/03/24    Notes (if needed):

## 2024-06-05 ENCOUNTER — TELEPHONE (OUTPATIENT)
Dept: OBSTETRICS AND GYNECOLOGY | Facility: HOSPITAL | Age: 29
End: 2024-06-05
Payer: MEDICAID

## 2024-06-05 NOTE — TELEPHONE ENCOUNTER
Spoke with patient over the phone.  Requested patient send in her blood sugar log at her convenience today.  Patient is agreeable.  Patient states she had taken to her OB appointment the other day but they had not been scanned into epic.\  Tianna Rios RN

## 2024-06-10 ENCOUNTER — ROUTINE PRENATAL (OUTPATIENT)
Dept: OBSTETRICS AND GYNECOLOGY | Facility: CLINIC | Age: 29
End: 2024-06-10
Payer: MEDICAID

## 2024-06-10 VITALS — BODY MASS INDEX: 44.14 KG/M2 | WEIGHT: 226 LBS | SYSTOLIC BLOOD PRESSURE: 110 MMHG | DIASTOLIC BLOOD PRESSURE: 80 MMHG

## 2024-06-10 DIAGNOSIS — O99.019 MATERNAL ANEMIA IN PREGNANCY, ANTEPARTUM: ICD-10-CM

## 2024-06-10 DIAGNOSIS — O35.BXX0 ANOMALY OF HEART OF FETUS AFFECTING PREGNANCY, ANTEPARTUM, SINGLE OR UNSPECIFIED FETUS: ICD-10-CM

## 2024-06-10 DIAGNOSIS — Z34.90 PRENATAL CARE, ANTEPARTUM: Primary | ICD-10-CM

## 2024-06-10 DIAGNOSIS — B95.1 POSITIVE GBS TEST: ICD-10-CM

## 2024-06-10 DIAGNOSIS — O24.410 DIET CONTROLLED GESTATIONAL DIABETES MELLITUS (GDM) IN THIRD TRIMESTER: ICD-10-CM

## 2024-06-10 LAB
GLUCOSE UR STRIP-MCNC: NEGATIVE MG/DL
PROT UR STRIP-MCNC: NEGATIVE MG/DL

## 2024-06-10 PROCEDURE — 99213 OFFICE O/P EST LOW 20 MIN: CPT | Performed by: OBSTETRICS & GYNECOLOGY

## 2024-06-10 NOTE — PROGRESS NOTES
OB FOLLOW UP  CC- Here for care of pregnancy        Fareed Garcia is a 29 y.o.  37w3d patient being seen today for her obstetrical follow up visit. Patient reports BH ctx and reports a thick mucusy d/c.    Her prenatal care is complicated by (and status) : see below. and GDM diet controlled. Her fasting BG range has been <94, with an average of 94. Her 2hr PP BG range has been <118, her average has been 110s.  Patient Active Problem List   Diagnosis    History of abnormal cervical Pap smear    Prenatal care, antepartum    Depression    Obesity in pregnancy, antepartum    Positive GBS test    Diet controlled gestational diabetes mellitus (GDM) in third trimester    Backache    Anxiety    Fatigue    Joint pain    Fetal cardiac anomaly complicating pregnancy, antepartum    Maternal anemia in pregnancy, antepartum       GBS Status:   Strep Gp B BOO   Date Value Ref Range Status   2023 Positive  Final         Allergies   Allergen Reactions    Coconut (Cocos Nucifera) Hives and Unknown - Low Severity          Her Delivery Plan is: [facility] IOL at 39wks. Scheduled   @ 9PM  US today: no  Non Stress Test: No.    ROS -   Patient Denies: Vaginal Spotting, Vision Changes, and Headaches  Fetal Movement : normal  All other systems reviewed and are negative.       The additional following portions of the patient's history were reviewed and updated as appropriate: allergies, current medications, past family history, past medical history, past social history, past surgical history, and problem list.    I have reviewed and agree with the HPI, ROS, and historical information as entered above. Lilly Hidalgo MD        EXAM:     Prenatal Vitals  BP: 110/80  Weight: 103 kg (226 lb)   Fetal Heart Rate: 138   Fundal Height (cm): 37 cm   Dilation/Effacement/Station  Dilation: 1  Effacement (%): 50  Station: -2      Urine Glucose Read-only: Negative  Urine Protein Read-only: Negative           Assessment and  Plan    Problem List Items Addressed This Visit          Endocrine and Metabolic    Diet controlled gestational diabetes mellitus (GDM) in third trimester    Overview     Abnormal 3 hr OGTT @ 14w 4d;   referral to endocrinology 1/3/24  Sends blood sugars to [facility] every week  At 25 weeks and 4 days-Dr. Rios recommended starting insulin and a Dexcom.  At 29 weeks and 6 days-patient reports she never started insulin.  She reports that she has been controlling this with her diet.  2024-at 31 weeks and 6 days-patient compliant with sending in blood sugars.  Good control with diet alone.            Gravid and     Prenatal care, antepartum - Primary    Overview     IOL  at 9 PM         Relevant Orders    POC Urinalysis Dipstick (Completed)    Fetal cardiac anomaly complicating pregnancy, antepartum    Overview     Possible VSD seen with [facility].    Not seen at 34 weeks.  They recommend follow-up at 38 weeks.  Next follow-up on     Recommend fetal echocardiogram after delivery.            Hematology and Neoplasia    Maternal anemia in pregnancy, antepartum    Overview     Hemoglobin 10.7 at 30 weeks.  Advised iron supplementation         Relevant Medications    ferrous sulfate 325 (65 FE) MG tablet       Other    Positive GBS test    Overview     Seen at urine at new OB visit            Pregnancy at 37w3d  Fetal status reassuring.   Reviewed Pre-eclampsia signs/symptoms  Delivery options reviewed with patient  Signs of labor reviewed  Kick counts reviewed  Activity and Exercise discussed.  Return in about 1 week (around 2024).    Lilly Hidalgo MD  06/10/2024

## 2024-06-20 ENCOUNTER — HOSPITAL ENCOUNTER (INPATIENT)
Facility: HOSPITAL | Age: 29
LOS: 4 days | Discharge: HOME | End: 2024-06-24
Attending: OBSTETRICS & GYNECOLOGY | Admitting: OBSTETRICS & GYNECOLOGY
Payer: COMMERCIAL

## 2024-06-20 ENCOUNTER — HOSPITAL ENCOUNTER (OUTPATIENT)
Dept: WOMENS IMAGING | Facility: HOSPITAL | Age: 29
Discharge: HOME | End: 2024-06-20
Admitting: OBSTETRICS & GYNECOLOGY
Payer: COMMERCIAL

## 2024-06-20 ENCOUNTER — OFFICE VISIT (OUTPATIENT)
Dept: OBSTETRICS AND GYNECOLOGY | Facility: HOSPITAL | Age: 29
End: 2024-06-20
Payer: COMMERCIAL

## 2024-06-20 ENCOUNTER — ANESTHESIA EVENT (OUTPATIENT)
Dept: LABOR AND DELIVERY | Facility: HOSPITAL | Age: 29
End: 2024-06-20
Payer: COMMERCIAL

## 2024-06-20 ENCOUNTER — ANESTHESIA (OUTPATIENT)
Dept: LABOR AND DELIVERY | Facility: HOSPITAL | Age: 29
End: 2024-06-20
Payer: COMMERCIAL

## 2024-06-20 ENCOUNTER — ROUTINE PRENATAL (OUTPATIENT)
Dept: OBSTETRICS AND GYNECOLOGY | Facility: CLINIC | Age: 29
End: 2024-06-20
Payer: COMMERCIAL

## 2024-06-20 VITALS — BODY MASS INDEX: 44.72 KG/M2 | WEIGHT: 229 LBS | DIASTOLIC BLOOD PRESSURE: 80 MMHG | SYSTOLIC BLOOD PRESSURE: 132 MMHG

## 2024-06-20 DIAGNOSIS — O24.410 DIET CONTROLLED GESTATIONAL DIABETES MELLITUS (GDM) IN THIRD TRIMESTER: ICD-10-CM

## 2024-06-20 DIAGNOSIS — O24.410 DIET CONTROLLED GESTATIONAL DIABETES MELLITUS (GDM) IN THIRD TRIMESTER: Primary | ICD-10-CM

## 2024-06-20 DIAGNOSIS — O99.210 OBESITY IN PREGNANCY, ANTEPARTUM: ICD-10-CM

## 2024-06-20 DIAGNOSIS — O99.019 MATERNAL ANEMIA IN PREGNANCY, ANTEPARTUM: ICD-10-CM

## 2024-06-20 DIAGNOSIS — B95.1 POSITIVE GBS TEST: ICD-10-CM

## 2024-06-20 DIAGNOSIS — O35.BXX0 ANOMALY OF HEART OF FETUS AFFECTING PREGNANCY, ANTEPARTUM, SINGLE OR UNSPECIFIED FETUS: ICD-10-CM

## 2024-06-20 DIAGNOSIS — Z34.90 PRENATAL CARE, ANTEPARTUM: Primary | ICD-10-CM

## 2024-06-20 PROBLEM — O40.3XX0 POLYHYDRAMNIOS IN THIRD TRIMESTER: Status: ACTIVE | Noted: 2024-06-20

## 2024-06-20 PROBLEM — O36.60X0 FETAL MACROSOMIA DURING PREGNANCY: Status: ACTIVE | Noted: 2024-06-20

## 2024-06-20 LAB
ABO GROUP BLD: NORMAL
ABO GROUP BLD: NORMAL
ALBUMIN SERPL-MCNC: 3.6 G/DL (ref 3.5–5.2)
ALBUMIN/GLOB SERPL: 1.1 G/DL
ALP SERPL-CCNC: 209 U/L (ref 39–117)
ALT SERPL W P-5'-P-CCNC: 12 U/L (ref 1–33)
AMPHET+METHAMPHET UR QL: NEGATIVE
AMPHETAMINES UR QL: NEGATIVE
ANION GAP SERPL CALCULATED.3IONS-SCNC: 12 MMOL/L (ref 5–15)
AST SERPL-CCNC: 32 U/L (ref 1–32)
ATMOSPHERIC PRESS: ABNORMAL MM[HG]
ATMOSPHERIC PRESS: ABNORMAL MM[HG]
BARBITURATES UR QL SCN: NEGATIVE
BASE EXCESS BLDCOA CALC-SCNC: -8.7 MMOL/L (ref 0–2)
BASE EXCESS BLDCOV CALC-SCNC: -6 MMOL/L (ref 0–2)
BDY SITE: ABNORMAL
BDY SITE: ABNORMAL
BENZODIAZ UR QL SCN: NEGATIVE
BILIRUB BLD-MCNC: NEGATIVE MG/DL
BILIRUB SERPL-MCNC: 0.3 MG/DL (ref 0–1.2)
BLD GP AB SCN SERPL QL: NEGATIVE
BODY TEMPERATURE: 37
BODY TEMPERATURE: 37
BUN SERPL-MCNC: 7 MG/DL (ref 6–20)
BUN/CREAT SERPL: 7.8 (ref 7–25)
BUPRENORPHINE SERPL-MCNC: NEGATIVE NG/ML
CALCIUM SPEC-SCNC: 9.9 MG/DL (ref 8.6–10.5)
CANNABINOIDS SERPL QL: NEGATIVE
CHLORIDE SERPL-SCNC: 106 MMOL/L (ref 98–107)
CLARITY, POC: CLEAR
CO2 BLDA-SCNC: 24.7 MMOL/L (ref 22–33)
CO2 BLDA-SCNC: 25.1 MMOL/L (ref 22–33)
CO2 SERPL-SCNC: 21 MMOL/L (ref 22–29)
COCAINE UR QL: NEGATIVE
COLOR UR: YELLOW
CREAT SERPL-MCNC: 0.9 MG/DL (ref 0.57–1)
DEPRECATED RDW RBC AUTO: 42.7 FL (ref 37–54)
EGFRCR SERPLBLD CKD-EPI 2021: 88.9 ML/MIN/1.73
EPAP: 0
EPAP: 0
ERYTHROCYTE [DISTWIDTH] IN BLOOD BY AUTOMATED COUNT: 14.3 % (ref 12.3–15.4)
FENTANYL UR-MCNC: NEGATIVE NG/ML
GLOBULIN UR ELPH-MCNC: 3.2 GM/DL
GLUCOSE SERPL-MCNC: 68 MG/DL (ref 65–99)
GLUCOSE UR STRIP-MCNC: NEGATIVE MG/DL
HCO3 BLDCOA-SCNC: 22.6 MMOL/L (ref 16.9–20.5)
HCO3 BLDCOV-SCNC: 23.3 MMOL/L (ref 18.6–21.4)
HCT VFR BLD AUTO: 32.5 % (ref 34–46.6)
HGB BLD-MCNC: 10.4 G/DL (ref 12–15.9)
HGB BLDA-MCNC: 18.6 G/DL (ref 14–18)
HGB BLDA-MCNC: 18.8 G/DL (ref 14–18)
INHALED O2 CONCENTRATION: 21 %
INHALED O2 CONCENTRATION: 21 %
IPAP: 0
IPAP: 0
KETONES UR QL: NEGATIVE
LDH SERPL-CCNC: 321 U/L (ref 135–214)
LEUKOCYTE EST, POC: NEGATIVE
Lab: ABNORMAL
MCH RBC QN AUTO: 26.5 PG (ref 26.6–33)
MCHC RBC AUTO-ENTMCNC: 32 G/DL (ref 31.5–35.7)
MCV RBC AUTO: 82.9 FL (ref 79–97)
METHADONE UR QL SCN: NEGATIVE
MODALITY: ABNORMAL
MODALITY: ABNORMAL
NITRITE UR-MCNC: NEGATIVE MG/ML
NOTE: 0
NOTIFIED BY: ABNORMAL
NOTIFIED WHO: ABNORMAL
OPIATES UR QL: NEGATIVE
OXYCODONE UR QL SCN: NEGATIVE
PAW @ PEAK INSP FLOW SETTING VENT: 0 CMH2O
PAW @ PEAK INSP FLOW SETTING VENT: 0 CMH2O
PCO2 BLDCOA: 68.7 MMHG (ref 43.3–54.9)
PCO2 BLDCOV: 58.6 MM HG (ref 28–40)
PCP UR QL SCN: NEGATIVE
PH BLDCOA: 7.13 PH UNITS (ref 7.22–7.3)
PH BLDCOV: 7.21 PH UNITS (ref 7.31–7.37)
PH UR: 6 [PH] (ref 5–8)
PLATELET # BLD AUTO: 160 10*3/MM3 (ref 140–450)
PMV BLD AUTO: 13.3 FL (ref 6–12)
PO2 BLDCOA: 10.6 MMHG (ref 11.5–43.3)
PO2 BLDCOV: 11.6 MM HG (ref 21–31)
POTASSIUM SERPL-SCNC: 4 MMOL/L (ref 3.5–5.2)
PROT SERPL-MCNC: 6.8 G/DL (ref 6–8.5)
PROT UR STRIP-MCNC: ABNORMAL MG/DL
RBC # BLD AUTO: 3.92 10*6/MM3 (ref 3.77–5.28)
RBC # UR STRIP: NEGATIVE /UL
RH BLD: POSITIVE
RH BLD: POSITIVE
SAO2 % BLDCOA: 13.4 %
SAO2 % BLDCOA: ABNORMAL %
SAO2 % BLDCOV: 12.1 %
SODIUM SERPL-SCNC: 139 MMOL/L (ref 136–145)
SP GR UR: 1.01 (ref 1–1.03)
T&S EXPIRATION DATE: NORMAL
TOTAL RATE: 0 BREATHS/MINUTE
TOTAL RATE: 0 BREATHS/MINUTE
TRICYCLICS UR QL SCN: NEGATIVE
URATE SERPL-MCNC: 6.1 MG/DL (ref 2.4–5.7)
UROBILINOGEN UR QL: ABNORMAL
WBC NRBC COR # BLD AUTO: 8.29 10*3/MM3 (ref 3.4–10.8)

## 2024-06-20 PROCEDURE — 76820 UMBILICAL ARTERY ECHO: CPT

## 2024-06-20 PROCEDURE — 25010000002 BUPIVACAINE IN DEXTROSE 0.75-8.25 % SOLUTION: Performed by: ANESTHESIOLOGY

## 2024-06-20 PROCEDURE — 25810000003 LACTATED RINGERS SOLUTION: Performed by: OBSTETRICS & GYNECOLOGY

## 2024-06-20 PROCEDURE — 25010000002 CEFAZOLIN 3 G RECONSTITUTED SOLUTION 1 EACH VIAL: Performed by: OBSTETRICS & GYNECOLOGY

## 2024-06-20 PROCEDURE — 25810000003 LACTATED RINGERS PER 1000 ML: Performed by: OBSTETRICS & GYNECOLOGY

## 2024-06-20 PROCEDURE — 80307 DRUG TEST PRSMV CHEM ANLYZR: CPT | Performed by: OBSTETRICS & GYNECOLOGY

## 2024-06-20 PROCEDURE — 25010000002 KETOROLAC TROMETHAMINE PER 15 MG: Performed by: OBSTETRICS & GYNECOLOGY

## 2024-06-20 PROCEDURE — C1765 ADHESION BARRIER: HCPCS | Performed by: OBSTETRICS & GYNECOLOGY

## 2024-06-20 PROCEDURE — 25010000002 ONDANSETRON PER 1 MG: Performed by: ANESTHESIOLOGY

## 2024-06-20 PROCEDURE — 86901 BLOOD TYPING SEROLOGIC RH(D): CPT | Performed by: OBSTETRICS & GYNECOLOGY

## 2024-06-20 PROCEDURE — 86900 BLOOD TYPING SEROLOGIC ABO: CPT | Performed by: OBSTETRICS & GYNECOLOGY

## 2024-06-20 PROCEDURE — 25010000002 MORPHINE PER 10 MG: Performed by: ANESTHESIOLOGY

## 2024-06-20 PROCEDURE — 80053 COMPREHEN METABOLIC PANEL: CPT | Performed by: OBSTETRICS & GYNECOLOGY

## 2024-06-20 PROCEDURE — 83615 LACTATE (LD) (LDH) ENZYME: CPT | Performed by: OBSTETRICS & GYNECOLOGY

## 2024-06-20 PROCEDURE — 25010000002 MIDAZOLAM PER 1 MG: Performed by: ANESTHESIOLOGY

## 2024-06-20 PROCEDURE — 59510 CESAREAN DELIVERY: CPT | Performed by: OBSTETRICS & GYNECOLOGY

## 2024-06-20 PROCEDURE — 86900 BLOOD TYPING SEROLOGIC ABO: CPT

## 2024-06-20 PROCEDURE — 25010000002 OXYTOCIN PER 10 UNITS: Performed by: ANESTHESIOLOGY

## 2024-06-20 PROCEDURE — 76816 OB US FOLLOW-UP PER FETUS: CPT

## 2024-06-20 PROCEDURE — 25810000003 LACTATED RINGERS PER 1000 ML: Performed by: ANESTHESIOLOGY

## 2024-06-20 PROCEDURE — 82805 BLOOD GASES W/O2 SATURATION: CPT

## 2024-06-20 PROCEDURE — 86850 RBC ANTIBODY SCREEN: CPT | Performed by: OBSTETRICS & GYNECOLOGY

## 2024-06-20 PROCEDURE — 25010000002 FENTANYL CITRATE (PF) 100 MCG/2ML SOLUTION: Performed by: ANESTHESIOLOGY

## 2024-06-20 PROCEDURE — 25010000002 HYDROMORPHONE PER 4 MG: Performed by: OBSTETRICS & GYNECOLOGY

## 2024-06-20 PROCEDURE — 86901 BLOOD TYPING SEROLOGIC RH(D): CPT

## 2024-06-20 PROCEDURE — 86780 TREPONEMA PALLIDUM: CPT | Performed by: OBSTETRICS & GYNECOLOGY

## 2024-06-20 PROCEDURE — 76819 FETAL BIOPHYS PROFIL W/O NST: CPT

## 2024-06-20 PROCEDURE — 84550 ASSAY OF BLOOD/URIC ACID: CPT | Performed by: OBSTETRICS & GYNECOLOGY

## 2024-06-20 PROCEDURE — 85027 COMPLETE CBC AUTOMATED: CPT | Performed by: OBSTETRICS & GYNECOLOGY

## 2024-06-20 DEVICE — ABSORBABLE ADHESION BARRIER
Type: IMPLANTABLE DEVICE | Site: UTERUS | Status: FUNCTIONAL
Brand: GYNECARE INTERCEED

## 2024-06-20 RX ORDER — SODIUM CHLORIDE, SODIUM LACTATE, POTASSIUM CHLORIDE, CALCIUM CHLORIDE 600; 310; 30; 20 MG/100ML; MG/100ML; MG/100ML; MG/100ML
125 INJECTION, SOLUTION INTRAVENOUS CONTINUOUS
Status: DISCONTINUED | OUTPATIENT
Start: 2024-06-20 | End: 2024-06-24 | Stop reason: HOSPADM

## 2024-06-20 RX ORDER — BUPIVACAINE HYDROCHLORIDE 7.5 MG/ML
INJECTION, SOLUTION INTRASPINAL AS NEEDED
Status: DISCONTINUED | OUTPATIENT
Start: 2024-06-20 | End: 2024-06-20 | Stop reason: SURG

## 2024-06-20 RX ORDER — MIDAZOLAM HYDROCHLORIDE 1 MG/ML
INJECTION INTRAMUSCULAR; INTRAVENOUS AS NEEDED
Status: DISCONTINUED | OUTPATIENT
Start: 2024-06-20 | End: 2024-06-20 | Stop reason: SURG

## 2024-06-20 RX ORDER — ONDANSETRON 2 MG/ML
INJECTION INTRAMUSCULAR; INTRAVENOUS AS NEEDED
Status: DISCONTINUED | OUTPATIENT
Start: 2024-06-20 | End: 2024-06-20 | Stop reason: SURG

## 2024-06-20 RX ORDER — MISOPROSTOL 200 UG/1
600 TABLET ORAL AS NEEDED
Status: DISCONTINUED | OUTPATIENT
Start: 2024-06-20 | End: 2024-06-24 | Stop reason: HOSPADM

## 2024-06-20 RX ORDER — OXYTOCIN/0.9 % SODIUM CHLORIDE 30/500 ML
999 PLASTIC BAG, INJECTION (ML) INTRAVENOUS ONCE
Status: COMPLETED | OUTPATIENT
Start: 2024-06-20 | End: 2024-06-20

## 2024-06-20 RX ORDER — HYDROMORPHONE HYDROCHLORIDE 1 MG/ML
0.5 INJECTION, SOLUTION INTRAMUSCULAR; INTRAVENOUS; SUBCUTANEOUS
Status: DISCONTINUED | OUTPATIENT
Start: 2024-06-20 | End: 2024-06-20 | Stop reason: HOSPADM

## 2024-06-20 RX ORDER — SIMETHICONE 80 MG
80 TABLET,CHEWABLE ORAL 4 TIMES DAILY PRN
Status: DISCONTINUED | OUTPATIENT
Start: 2024-06-20 | End: 2024-06-24 | Stop reason: HOSPADM

## 2024-06-20 RX ORDER — KETOROLAC TROMETHAMINE 15 MG/ML
15 INJECTION, SOLUTION INTRAMUSCULAR; INTRAVENOUS EVERY 6 HOURS
Status: ACTIVE | OUTPATIENT
Start: 2024-06-21 | End: 2024-06-22

## 2024-06-20 RX ORDER — SODIUM CHLORIDE 0.9 % (FLUSH) 0.9 %
10 SYRINGE (ML) INJECTION EVERY 12 HOURS SCHEDULED
Status: DISCONTINUED | OUTPATIENT
Start: 2024-06-20 | End: 2024-06-20 | Stop reason: HOSPADM

## 2024-06-20 RX ORDER — ACETAMINOPHEN 325 MG/1
650 TABLET ORAL EVERY 6 HOURS
Status: DISCONTINUED | OUTPATIENT
Start: 2024-06-21 | End: 2024-06-24 | Stop reason: HOSPADM

## 2024-06-20 RX ORDER — SODIUM CHLORIDE, SODIUM LACTATE, POTASSIUM CHLORIDE, CALCIUM CHLORIDE 600; 310; 30; 20 MG/100ML; MG/100ML; MG/100ML; MG/100ML
INJECTION, SOLUTION INTRAVENOUS CONTINUOUS PRN
Status: DISCONTINUED | OUTPATIENT
Start: 2024-06-20 | End: 2024-06-20 | Stop reason: SURG

## 2024-06-20 RX ORDER — PHENYLEPHRINE HCL IN 0.9% NACL 1 MG/10 ML
SYRINGE (ML) INTRAVENOUS AS NEEDED
Status: DISCONTINUED | OUTPATIENT
Start: 2024-06-20 | End: 2024-06-20 | Stop reason: SURG

## 2024-06-20 RX ORDER — ACETAMINOPHEN 500 MG
1000 TABLET ORAL EVERY 6 HOURS
Status: DISPENSED | OUTPATIENT
Start: 2024-06-20 | End: 2024-06-21

## 2024-06-20 RX ORDER — SODIUM CHLORIDE 0.9 % (FLUSH) 0.9 %
10 SYRINGE (ML) INJECTION AS NEEDED
Status: DISCONTINUED | OUTPATIENT
Start: 2024-06-20 | End: 2024-06-20 | Stop reason: HOSPADM

## 2024-06-20 RX ORDER — LIDOCAINE HYDROCHLORIDE 10 MG/ML
0.5 INJECTION, SOLUTION EPIDURAL; INFILTRATION; INTRACAUDAL; PERINEURAL ONCE AS NEEDED
Status: DISCONTINUED | OUTPATIENT
Start: 2024-06-20 | End: 2024-06-20 | Stop reason: HOSPADM

## 2024-06-20 RX ORDER — PROMETHAZINE HYDROCHLORIDE 12.5 MG/1
12.5 SUPPOSITORY RECTAL EVERY 6 HOURS PRN
Status: DISCONTINUED | OUTPATIENT
Start: 2024-06-20 | End: 2024-06-24 | Stop reason: HOSPADM

## 2024-06-20 RX ORDER — CITRIC ACID/SODIUM CITRATE 334-500MG
30 SOLUTION, ORAL ORAL ONCE
Status: COMPLETED | OUTPATIENT
Start: 2024-06-20 | End: 2024-06-20

## 2024-06-20 RX ORDER — SODIUM CHLORIDE 0.9 % (FLUSH) 0.9 %
1-10 SYRINGE (ML) INJECTION AS NEEDED
Status: DISCONTINUED | OUTPATIENT
Start: 2024-06-20 | End: 2024-06-23

## 2024-06-20 RX ORDER — IBUPROFEN 600 MG/1
600 TABLET ORAL EVERY 6 HOURS
Status: DISCONTINUED | OUTPATIENT
Start: 2024-06-21 | End: 2024-06-24 | Stop reason: HOSPADM

## 2024-06-20 RX ORDER — DOCUSATE SODIUM 100 MG/1
100 CAPSULE, LIQUID FILLED ORAL 2 TIMES DAILY PRN
Status: DISCONTINUED | OUTPATIENT
Start: 2024-06-20 | End: 2024-06-24 | Stop reason: HOSPADM

## 2024-06-20 RX ORDER — CARBOPROST TROMETHAMINE 250 UG/ML
250 INJECTION, SOLUTION INTRAMUSCULAR AS NEEDED
Status: DISCONTINUED | OUTPATIENT
Start: 2024-06-20 | End: 2024-06-24 | Stop reason: HOSPADM

## 2024-06-20 RX ORDER — OXYCODONE HYDROCHLORIDE 10 MG/1
10 TABLET ORAL EVERY 4 HOURS PRN
Status: DISCONTINUED | OUTPATIENT
Start: 2024-06-20 | End: 2024-06-24 | Stop reason: HOSPADM

## 2024-06-20 RX ORDER — MISOPROSTOL 200 UG/1
800 TABLET ORAL AS NEEDED
Status: DISCONTINUED | OUTPATIENT
Start: 2024-06-20 | End: 2024-06-20 | Stop reason: HOSPADM

## 2024-06-20 RX ORDER — ACETAMINOPHEN 500 MG
1000 TABLET ORAL ONCE
Status: COMPLETED | OUTPATIENT
Start: 2024-06-20 | End: 2024-06-20

## 2024-06-20 RX ORDER — SODIUM CHLORIDE 9 MG/ML
40 INJECTION, SOLUTION INTRAVENOUS AS NEEDED
Status: DISCONTINUED | OUTPATIENT
Start: 2024-06-20 | End: 2024-06-23

## 2024-06-20 RX ORDER — PRENATAL VIT/IRON FUM/FOLIC AC 27MG-0.8MG
1 TABLET ORAL DAILY
Status: DISCONTINUED | OUTPATIENT
Start: 2024-06-21 | End: 2024-06-24 | Stop reason: HOSPADM

## 2024-06-20 RX ORDER — OXYCODONE HYDROCHLORIDE 5 MG/1
5 TABLET ORAL EVERY 4 HOURS PRN
Status: DISCONTINUED | OUTPATIENT
Start: 2024-06-20 | End: 2024-06-24 | Stop reason: HOSPADM

## 2024-06-20 RX ORDER — PROMETHAZINE HYDROCHLORIDE 25 MG/1
25 TABLET ORAL EVERY 6 HOURS PRN
Status: DISCONTINUED | OUTPATIENT
Start: 2024-06-20 | End: 2024-06-24 | Stop reason: HOSPADM

## 2024-06-20 RX ORDER — CALCIUM CARBONATE 500 MG/1
1 TABLET, CHEWABLE ORAL EVERY 4 HOURS PRN
Status: DISCONTINUED | OUTPATIENT
Start: 2024-06-20 | End: 2024-06-24 | Stop reason: HOSPADM

## 2024-06-20 RX ORDER — SODIUM CHLORIDE 0.9 % (FLUSH) 0.9 %
10 SYRINGE (ML) INJECTION EVERY 12 HOURS SCHEDULED
Status: DISCONTINUED | OUTPATIENT
Start: 2024-06-20 | End: 2024-06-23

## 2024-06-20 RX ORDER — ONDANSETRON 2 MG/ML
4 INJECTION INTRAMUSCULAR; INTRAVENOUS EVERY 6 HOURS PRN
Status: DISCONTINUED | OUTPATIENT
Start: 2024-06-20 | End: 2024-06-24 | Stop reason: HOSPADM

## 2024-06-20 RX ORDER — ALUMINA, MAGNESIA, AND SIMETHICONE 2400; 2400; 240 MG/30ML; MG/30ML; MG/30ML
15 SUSPENSION ORAL EVERY 4 HOURS PRN
Status: DISCONTINUED | OUTPATIENT
Start: 2024-06-20 | End: 2024-06-24 | Stop reason: HOSPADM

## 2024-06-20 RX ORDER — ENOXAPARIN SODIUM 100 MG/ML
40 INJECTION SUBCUTANEOUS EVERY 12 HOURS
Status: DISCONTINUED | OUTPATIENT
Start: 2024-06-21 | End: 2024-06-24 | Stop reason: HOSPADM

## 2024-06-20 RX ORDER — SODIUM CHLORIDE 9 MG/ML
40 INJECTION, SOLUTION INTRAVENOUS AS NEEDED
Status: DISCONTINUED | OUTPATIENT
Start: 2024-06-20 | End: 2024-06-20 | Stop reason: HOSPADM

## 2024-06-20 RX ORDER — OXYTOCIN/0.9 % SODIUM CHLORIDE 30/500 ML
250 PLASTIC BAG, INJECTION (ML) INTRAVENOUS CONTINUOUS
Status: ACTIVE | OUTPATIENT
Start: 2024-06-20 | End: 2024-06-20

## 2024-06-20 RX ORDER — CARBOPROST TROMETHAMINE 250 UG/ML
250 INJECTION, SOLUTION INTRAMUSCULAR AS NEEDED
Status: DISCONTINUED | OUTPATIENT
Start: 2024-06-20 | End: 2024-06-20 | Stop reason: HOSPADM

## 2024-06-20 RX ORDER — ONDANSETRON 4 MG/1
4 TABLET, ORALLY DISINTEGRATING ORAL EVERY 8 HOURS PRN
Status: DISCONTINUED | OUTPATIENT
Start: 2024-06-20 | End: 2024-06-24 | Stop reason: HOSPADM

## 2024-06-20 RX ORDER — METHYLERGONOVINE MALEATE 0.2 MG/ML
200 INJECTION INTRAVENOUS AS NEEDED
Status: DISCONTINUED | OUTPATIENT
Start: 2024-06-20 | End: 2024-06-20 | Stop reason: HOSPADM

## 2024-06-20 RX ORDER — FAMOTIDINE 10 MG/ML
INJECTION, SOLUTION INTRAVENOUS AS NEEDED
Status: DISCONTINUED | OUTPATIENT
Start: 2024-06-20 | End: 2024-06-20 | Stop reason: SURG

## 2024-06-20 RX ORDER — MORPHINE SULFATE 0.5 MG/ML
INJECTION, SOLUTION EPIDURAL; INTRATHECAL; INTRAVENOUS AS NEEDED
Status: DISCONTINUED | OUTPATIENT
Start: 2024-06-20 | End: 2024-06-20 | Stop reason: SURG

## 2024-06-20 RX ORDER — OXYTOCIN/0.9 % SODIUM CHLORIDE 30/500 ML
125 PLASTIC BAG, INJECTION (ML) INTRAVENOUS ONCE AS NEEDED
Status: DISCONTINUED | OUTPATIENT
Start: 2024-06-20 | End: 2024-06-24 | Stop reason: HOSPADM

## 2024-06-20 RX ORDER — OXYTOCIN/0.9 % SODIUM CHLORIDE 30/500 ML
PLASTIC BAG, INJECTION (ML) INTRAVENOUS AS NEEDED
Status: DISCONTINUED | OUTPATIENT
Start: 2024-06-20 | End: 2024-06-20 | Stop reason: SURG

## 2024-06-20 RX ORDER — HYDROCORTISONE 25 MG/G
1 CREAM TOPICAL AS NEEDED
Status: DISCONTINUED | OUTPATIENT
Start: 2024-06-20 | End: 2024-06-24 | Stop reason: HOSPADM

## 2024-06-20 RX ORDER — METHYLERGONOVINE MALEATE 0.2 MG/ML
200 INJECTION INTRAVENOUS AS NEEDED
Status: DISCONTINUED | OUTPATIENT
Start: 2024-06-20 | End: 2024-06-24 | Stop reason: HOSPADM

## 2024-06-20 RX ORDER — HYDROMORPHONE HYDROCHLORIDE 1 MG/ML
0.5 INJECTION, SOLUTION INTRAMUSCULAR; INTRAVENOUS; SUBCUTANEOUS
Status: DISCONTINUED | OUTPATIENT
Start: 2024-06-20 | End: 2024-06-24 | Stop reason: HOSPADM

## 2024-06-20 RX ORDER — OXYTOCIN 10 [USP'U]/ML
INJECTION, SOLUTION INTRAMUSCULAR; INTRAVENOUS AS NEEDED
Status: DISCONTINUED | OUTPATIENT
Start: 2024-06-20 | End: 2024-06-20 | Stop reason: SURG

## 2024-06-20 RX ORDER — FENTANYL CITRATE 50 UG/ML
INJECTION, SOLUTION INTRAMUSCULAR; INTRAVENOUS AS NEEDED
Status: DISCONTINUED | OUTPATIENT
Start: 2024-06-20 | End: 2024-06-20 | Stop reason: SURG

## 2024-06-20 RX ORDER — KETOROLAC TROMETHAMINE 30 MG/ML
30 INJECTION, SOLUTION INTRAMUSCULAR; INTRAVENOUS ONCE
Status: COMPLETED | OUTPATIENT
Start: 2024-06-20 | End: 2024-06-20

## 2024-06-20 RX ADMIN — ONDANSETRON 4 MG: 2 INJECTION INTRAMUSCULAR; INTRAVENOUS at 17:46

## 2024-06-20 RX ADMIN — KETOROLAC TROMETHAMINE 30 MG: 30 INJECTION, SOLUTION INTRAMUSCULAR; INTRAVENOUS at 19:54

## 2024-06-20 RX ADMIN — SODIUM CHLORIDE, POTASSIUM CHLORIDE, SODIUM LACTATE AND CALCIUM CHLORIDE 1000 ML: 600; 310; 30; 20 INJECTION, SOLUTION INTRAVENOUS at 15:53

## 2024-06-20 RX ADMIN — FAMOTIDINE 20 MG: 10 INJECTION, SOLUTION INTRAVENOUS at 17:46

## 2024-06-20 RX ADMIN — OXYTOCIN 10 UNITS: 10 INJECTION, SOLUTION INTRAMUSCULAR; INTRAVENOUS at 18:15

## 2024-06-20 RX ADMIN — SODIUM CITRATE AND CITRIC ACID MONOHYDRATE 30 ML: 500; 334 SOLUTION ORAL at 17:41

## 2024-06-20 RX ADMIN — SODIUM CHLORIDE 3000 MG: 900 INJECTION INTRAVENOUS at 17:41

## 2024-06-20 RX ADMIN — Medication 500 ML: at 18:17

## 2024-06-20 RX ADMIN — SODIUM CHLORIDE, POTASSIUM CHLORIDE, SODIUM LACTATE AND CALCIUM CHLORIDE 125 ML/HR: 600; 310; 30; 20 INJECTION, SOLUTION INTRAVENOUS at 19:54

## 2024-06-20 RX ADMIN — MORPHINE SULFATE 0.15 MG: 0.5 INJECTION, SOLUTION EPIDURAL; INTRATHECAL; INTRAVENOUS at 17:53

## 2024-06-20 RX ADMIN — HYDROMORPHONE HYDROCHLORIDE 0.5 MG: 1 INJECTION, SOLUTION INTRAMUSCULAR; INTRAVENOUS; SUBCUTANEOUS at 21:27

## 2024-06-20 RX ADMIN — ONDANSETRON 4 MG: 2 INJECTION INTRAMUSCULAR; INTRAVENOUS at 20:41

## 2024-06-20 RX ADMIN — SODIUM CHLORIDE, POTASSIUM CHLORIDE, SODIUM LACTATE AND CALCIUM CHLORIDE 1000 ML/HR: 600; 310; 30; 20 INJECTION, SOLUTION INTRAVENOUS at 17:12

## 2024-06-20 RX ADMIN — Medication 999 ML/HR: at 19:21

## 2024-06-20 RX ADMIN — ACETAMINOPHEN 1000 MG: 500 TABLET ORAL at 16:22

## 2024-06-20 RX ADMIN — MIDAZOLAM HYDROCHLORIDE 1 MG: 1 INJECTION, SOLUTION INTRAMUSCULAR; INTRAVENOUS at 17:46

## 2024-06-20 RX ADMIN — BUPIVACAINE HYDROCHLORIDE IN DEXTROSE 1.2 ML: 7.5 INJECTION, SOLUTION SUBARACHNOID at 17:53

## 2024-06-20 RX ADMIN — Medication 200 MCG: at 18:31

## 2024-06-20 RX ADMIN — Medication 200 MCG: at 18:00

## 2024-06-20 RX ADMIN — SODIUM CHLORIDE, POTASSIUM CHLORIDE, SODIUM LACTATE AND CALCIUM CHLORIDE: 600; 310; 30; 20 INJECTION, SOLUTION INTRAVENOUS at 17:44

## 2024-06-20 RX ADMIN — FENTANYL CITRATE 20 MCG: 50 INJECTION, SOLUTION INTRAMUSCULAR; INTRAVENOUS at 17:53

## 2024-06-20 NOTE — PROGRESS NOTES
OB FOLLOW UP  CC- Here for care of pregnancy        Fareed Garcia is a 29 y.o.  38w6d patient being seen today for her obstetrical follow up visit. Patient reports losing her mucus plug throughout the day and a lot of pelvic pain/cramping. Pt. Reports 1+ proteinuria at [facility] and negative glucose.    Her prenatal care is complicated by (and status) : see below. and GDM diet controlled. Her fasting BG range has been 93-95, with an average of 94. Her 2hr PP BG range has been 104/121, her average has been 110.  Patient Active Problem List   Diagnosis    History of abnormal cervical Pap smear    Prenatal care, antepartum    Depression    Obesity in pregnancy, antepartum    Positive GBS test    Diet controlled gestational diabetes mellitus (GDM) in third trimester    Backache    Anxiety    Fatigue    Joint pain    Fetal cardiac anomaly complicating pregnancy, antepartum    Maternal anemia in pregnancy, antepartum       GBS Status:   Strep Gp B BOO   Date Value Ref Range Status   2023 Positive  Final         Allergies   Allergen Reactions    Coconut (Cocos Nucifera) Hives and Unknown - Low Severity          Her Delivery Plan is: Undecided    US today: yes at [facility]. BPP  FARRUKH 27.9 EFW 9#12oz  Non Stress Test: No.    ROS -   Patient Denies: Vaginal Spotting, Vision Changes, and Headaches  Fetal Movement : normal  All other systems reviewed and are negative.       The additional following portions of the patient's history were reviewed and updated as appropriate: allergies, current medications, past family history, past medical history, past social history, past surgical history, and problem list.    I have reviewed and agree with the HPI, ROS, and historical information as entered above. Lilly Hidalgo MD        EXAM:     Prenatal Vitals  BP: 132/80  Weight: 104 kg (229 lb)   Fetal Heart Rate: 136bpm                          Assessment and Plan    Problem List Items Addressed This Visit           Endocrine and Metabolic    Diet controlled gestational diabetes mellitus (GDM) in third trimester    Overview     Abnormal 3 hr OGTT @ 14w 4d;   referral to endocrinology 1/3/24  Sends blood sugars to [facility] every week  At 25 weeks and 4 days-Dr. Rios recommended starting insulin and a Dexcom.  At 29 weeks and 6 days-patient reports she never started insulin.  She reports that she has been controlling this with her diet.  2024-at 31 weeks and 6 days-patient compliant with sending in blood sugars.  Good control with diet alone.            Gravid and     Prenatal care, antepartum - Primary    Overview     IOL  at 9 PM         Obesity in pregnancy, antepartum    Overview     Hemoglobin A1c at new OB 6.2  Early Glucola 173  Baby aspirin weeks 12 through 36  Growth ultrasound at 32 and 37 weeks  Discussed carbohydrate control, daily exercise, water intake.         Fetal cardiac anomaly complicating pregnancy, antepartum    Overview     Possible VSD seen with [facility].    Not seen at 34 weeks.  They recommend follow-up at 38 weeks.  Next follow-up on     Recommend fetal echocardiogram after delivery.            Hematology and Neoplasia    Maternal anemia in pregnancy, antepartum    Overview     Hemoglobin 10.7 at 30 weeks.  Advised iron supplementation         Relevant Medications    ferrous sulfate 325 (65 FE) MG tablet       Other    Positive GBS test    Overview     Seen at urine at new OB visit            Pregnancy at 38w6d  Concern for fetal macrosomia.  Discussed with Dr. Mitchell from [facility].  He recommended delivery today secondary to concern for uncontrolled diabetes.  AC is measuring out of range and estimated fetal weight was 4420 g.  In light of concern for fetal macrosomia and potential for shoulder dystocia, patient elected for an out right  section.  Plan for today.  Patient sent to labor and delivery.  Return in about 2 weeks (around 2024) for With NP for incision  check.    Lilly Hidalgo MD  06/20/2024

## 2024-06-20 NOTE — H&P
History and Physical:    Subjective     Chief Complaint   Patient presents with    Scheduled        Fareed Garcia is a 29 y.o. year old  with an Estimated Date of Delivery: 24 currently at 38w6d presenting with  gestational diabetes that appears to be uncontrolled.  Ultrasound today showed polyhydramnios and fetal macrosomia.  Dr. Mitchell from [facility] evaluated patient and felt delivery was warranted today due to concern for uncontrolled diabetes with ultrasound findings.  He recommended delivery. .    Prenatal care has been with Lilly Hidalgo MD.  It has been significant for diabetes (GDM A1) and obesity in pregnancy , polyhydramnios, fetal macrosomia.      Review of Systems   Constitutional: Negative.    HENT: Negative.     Respiratory: Negative.     Cardiovascular: Negative.    Gastrointestinal: Negative.    Genitourinary: Negative.    Musculoskeletal: Negative.    Skin: Negative.    Allergic/Immunologic: Negative.    Neurological: Negative.    Hematological: Negative.    Psychiatric/Behavioral: Negative.             Past Medical History:   Diagnosis Date    Abnormal Pap smear of cervix     ADHD (attention deficit hyperactivity disorder)     Anxiety 2024    Depression 2023    GERD (gastroesophageal reflux disease)     Gestational diabetes mellitus (GDM) 2024     Past Surgical History:   Procedure Laterality Date    BILATERAL BREAST REDUCTION      WISDOM TOOTH EXTRACTION       Family History   Problem Relation Age of Onset    Breast cancer Maternal Grandmother         Great Grandmother     Social History     Tobacco Use    Smoking status: Never    Smokeless tobacco: Never   Vaping Use    Vaping status: Never Used   Substance Use Topics    Alcohol use: No    Drug use: No     Medications Prior to Admission   Medication Sig Dispense Refill Last Dose    ferrous sulfate 325 (65 FE) MG tablet Take 1 tablet by mouth Daily With Breakfast.   Past Week    prenatal vitamin  "(prenatal, CLASSIC, vitamin) tablet Take 1 tablet by mouth Daily.   Past Week    Accu-Chek Guide test strip        Accu-Chek Softclix Lancets lancets        Blood Glucose Monitoring Suppl (Accu-Chek Guide) w/Device kit         Allergies:  Coconut (cocos nucifera)  OB History    Para Term  AB Living   2 0 0 0 1 0   SAB IAB Ectopic Molar Multiple Live Births   0 1 0 0 0 0      # Outcome Date GA Lbr Noe/2nd Weight Sex Type Anes PTL Lv   2 Current            1 IAB  5w0d             Obstetric Comments   Fob #1 - Pregnancy #1    Fob #2 - Pregnancy #2              Objective     /84 (BP Location: Left arm, Patient Position: Lying) Comment (Patient Position): green cuff  Pulse 110   Temp 98.4 °F (36.9 °C) (Oral)   Resp 20   Ht 152.4 cm (60\")   Wt 104 kg (229 lb)   LMP 2023 (Exact Date)   BMI 44.72 kg/m²     Physical Exam    General:  No acute distress   Lung: Clear to auscultation bilaterally, no wheezing, clear at bases   Heart: Regular rate and rhythm, no murmurs    Abdomen: Gravid, nontender   Extremities: 2+ DTR's bilaterally, no edema   FHT's: reactive    Cervix: deferred   [place]: Contraction are irregular           Lab Review   External Prenatal Results       Pregnancy Outside Results - Transcribed From Office Records - See Scanned Records For Details       Test Value Date Time    ABO  O  23 1120    Rh  Positive  23 1120    Antibody Screen  Negative  24 1053       Negative  23 1120    Varicella IgG       Rubella  1.48 index 23 1120    Hgb  10.7 g/dL 24 1053       12.4 g/dL 23 1120    Hct  32.3 % 24 1053       37.8 % 23 1120    HgB A1c   5.6 % 24 1501       6.2 % 23 1120    1h GTT  173 mg/dL 23 1105    3h GTT Fasting  80 mg/dL 24 1517    3h GTT 1 hour  217 mg/dL 24 1517    3h GTT 2 hour  224 mg/dL 24 1517    3h GTT 3 hour   198 mg/dL 24 1517    Gonorrhea (discrete)  Negative  23 1120 "    Chlamydia (discrete)  Negative  11/28/23 1120    RPR  Non Reactive  04/18/24 1053       Non Reactive  11/28/23 1120    Syphilis Antibody       HBsAg  Negative  11/28/23 1120    Herpes Simplex Virus PCR       Herpes Simplex VIrus Culture       HIV  Non Reactive  11/28/23 1120    Hep C RNA Quant PCR       Hep C Antibody  Non Reactive  11/28/23 1120    AFP       NIPT       Cystic Fibroisis        Group B Strep ^ Positive  11/28/23     GBS Susceptibility to Clindamycin       GBS Susceptibility to Erythromycin       Fetal Fibronectin       Genetic Testing, Maternal Blood                 Drug Screening       Test Value Date Time    Urine Drug Screen       Amphetamine Screen       Barbiturate Screen       Benzodiazepine Screen       Methadone Screen       Phencyclidine Screen       Opiates Screen       THC Screen       Cocaine Screen       Propoxyphene Screen       Buprenorphine Screen       Methamphetamine Screen       Oxycodone Screen       Tricyclic Antidepressants Screen                 Legend    ^: Historical                                Lab Results   Component Value Date    ALKPHOS 72 01/29/2019    ALT 16 01/29/2019    AST 21 01/29/2019    CREATININE 0.80 01/29/2019    BILITOT 0.7 01/29/2019     Lab Results   Component Value Date    WBC 8.29 06/20/2024    HGB 10.4 (L) 06/20/2024    HCT 32.5 (L) 06/20/2024    MCV 82.9 06/20/2024     06/20/2024        Results for orders placed in visit on 03/11/24    US Ob Follow Up Transabdominal Approach    Narrative  PAT NAME: VERONA MORSE  MED REC#: [id number]  BIRTH [birth year]: [id number]  PAT GEND: F  ACCOUNT#: [id number]  PAT TYPE: O  EXAM JELLY: [id number]  REF PHYS MARÍA SPARROW  ACCESSION [id number]      Sonographer Comments  ====================    anatomy scan complete appears wnl      Indication  ========    f/u anatomy scan ; Obesity      Comparison Studies  =================    The findings of this study are compared to the prior ultrasound study dated  2/15/2024      Method  =======    [place] E6, Transabdominal ultrasound examination      Pregnancy  =========    [place]in pregnancy. Number of fetuses: 1      Dating  ======    LMP on: 2023  GA by LMP 24 w + 3 d  BRANDYN by LMP: 2024  Method of dating: based on stated BRANDYN  GA by prior assessment 24 w + 3 d  BRANDYN by prior assessment: 2024  Ultrasound examination on: 3/11/2024  GA by U/S based upon: AC, BPD, Femur, HC  GA by U/S 23 w + 6 d  BRANDYN by U/S: 2024  Previous dating: based on stated BRANDYN, selected on 02/15/2024  Agreed BRANDYN of previous datin2024  Assigned: based on stated BRANDYN, selected on 2024  Assigned GA 24 w + 3 d  Assigned BRANDYN: 2024  Pregnancy length 280 d      General Evaluation  ================    Cardiac activity present.  bpm.  Fetal movements visualized.  Presentation breech.  Placenta Placental site: anterior.  Umbilical cord Cord vessels: 3 vessel cord.  Amniotic fluid Amount of AF: normal.      Fetal Biometry  ============    Standard  BPD 57.8 mm  23w 5d        19%        Hadlock    .3 mm  24w 0d        16%        Hadlock    .0 mm  23w 1d        9%        Hadlock    Femur 44.2 mm  24w 4d        41%        Hadlock    HC / AC 1.20     g  34%        Javy    EFW (lb) 1 lb  EFW (oz) 6 oz  EFW by: Hadlock (BPD-HC-AC-FL)  Extremities / Bony Struc  FL / BPD 0.76    FL / HC 0.20    FL / AC 0.24    Other Structures   bpm      Fetal Anatomy  ============    Lateral ventricles: Appears normal  Lips: Appear normal  Profile: Appears normal  Nose: Appears normal  4-chamber view: Appears normal  RVOT view: Appears normal  LVOT view: Appears normal  Heart / Thorax  3-vessel view: Appears normal  3-vessel-trachea view: Appears normal  Diaphragm: Appears normal  Stomach: Appears normal  Kidneys: Appears normal  Bladder: Appears normal  Gender: female  Wants to know gender: yes      Impression  ==========    Female infant in breech presentation  fetal heart rate of 148. Intraplacental and three-vessel cord noted. Normal fluid volume. Estimated fetal weight 1 pound 6 ounces  which is 34th percentile. Of note AC is measuring less than 10th percentile at 9th percentile. Heart views seen today within normal limits. Lips and nose seen today and  appear normal as well. Anatomy scan now complete.      Recommendation  ===============    Recommend PDC evaluation of fetal abdomen.        Sonographer: RT THAO Self, [facility]  Physician: Lilly Hidalgo MD, FACOG    Electronically signed by: Lilly Hidalgo MD, FACOG at: 2024/03/19 16:03              Patient Active Problem List    Diagnosis Date Noted    *Pregnancy 06/20/2024    Maternal anemia in pregnancy, antepartum 04/22/2024     Note Last Updated: 4/22/2024     Hemoglobin 10.7 at 30 weeks.  Advised iron supplementation      Fetal cardiac anomaly complicating pregnancy, antepartum 03/22/2024     Note Last Updated: 5/20/2024     Possible VSD seen with PDC.    Not seen at 34 weeks.  They recommend follow-up at 38 weeks.  Next follow-up on 6/18    Recommend fetal echocardiogram after delivery.      Backache 01/04/2024    Anxiety 01/04/2024    Fatigue 01/04/2024    Joint pain 01/04/2024    Diet controlled gestational diabetes mellitus (GDM) in third trimester 01/03/2024     Note Last Updated: 5/2/2024     Abnormal 3 hr OGTT @ 14w 4d;   referral to endocrinology 1/3/24  Sends blood sugars to [facility] every week  At 25 weeks and 4 days-Dr. Rios recommended starting insulin and a Dexcom.  At 29 weeks and 6 days-patient reports she never started insulin.  She reports that she has been controlling this with her diet.  5/2/2024-at 31 weeks and 6 days-patient compliant with sending in blood sugars.  Good control with diet alone.      Positive GBS test 12/05/2023     Note Last Updated: 12/5/2023     Seen at urine at new OB visit      Prenatal care, antepartum 11/28/2023     Note Last Updated: 5/13/2024     IOL 6/23 at 9 PM       Depression 2023     Note Last Updated: 2023     Following with psych counselor.  Considering medicine after first trimester.      Obesity in pregnancy, antepartum 2023     Note Last Updated: 2024     Hemoglobin A1c at new OB 6.2  Early Glucola 173  Baby aspirin weeks 12 through 36  Growth ultrasound at 32 and 37 weeks  Discussed carbohydrate control, daily exercise, water intake.      History of abnormal cervical Pap smear 2022     Note Last Updated: 2022-ASCUS.  HPV negative.  Recommend follow-up in 1 year.          Assessment & Plan     ASSESSMENT  IUP at 38w6d  A1 diabetes -poorly controlled  Fetal macrosomia-discussed delivery options.  Patient concern for shoulder dystocia and desires out right  section due to fetus projected to measure 9 pounds 12 ounces.  Polyhydramnios  Obesity in pregnancy   3. GBBS positive    PLAN  Admit to labor and delivery   2.   Proceed with  section         Lilly Hidalgo MD  2024@

## 2024-06-20 NOTE — ASSESSMENT & PLAN NOTE
Today's ultrasound shows overall fetal growth at the 98th percentile and abdominal circumference greater than the 99th percentile with FARRUKH of 28.  We discussed that given size, polyhydramnios, and diagnosis of gestational diabetes would recommend proceeding with delivery today.  Estimated fetal weight today measuring 4420 g and we did discuss that 4500 g is the recommendation for out right  section, though this would not be unreasonable to perform due to the proximity to 4500 g.  Dr. Lynch notified.

## 2024-06-20 NOTE — LETTER
"2024       No Recipients    Patient: Fareed Garcia   YOB: 1995   Date of Visit: 2024       Dear MARIOLA Givens,    Thank you for referring Fareed Garcia to me for evaluation. Below is a copy of my consult note.    If you have questions, please do not hesitate to call me. I look forward to following Fareed along with you.         Sincerely,        Brad Mitchell MD        CC:   No Recipients        Maternal/Fetal Medicine Consult Note   Date: 2024  Name: Fareed Garcia    : 1995     MRN: [id number]     Referring Provider: MARIOLA Givens    Chief Complaint  No chief complaint on file.    Subjective     History of Present Illness:  Fareed Garcia is a 29 y.o.  38w6d who presents today for gestational diabetes    BRANDYN: Estimated Date of Delivery: 24     ROS:   Otherwise Noted in HPI      Current Outpatient Medications:   •  Accu-Chek Guide test strip, , Disp: , Rfl:   •  Accu-Chek Softclix Lancets lancets, , Disp: , Rfl:   •  Blood Glucose Monitoring Suppl (Accu-Chek Guide) w/Device kit, , Disp: , Rfl:   •  ferrous sulfate 325 (65 FE) MG tablet, Take 1 tablet by mouth Daily With Breakfast., Disp: , Rfl:   •  prenatal vitamin (prenatal, CLASSIC, vitamin) tablet, Take 1 tablet by mouth Daily., Disp: , Rfl:     Objective     Vital Signs  LMP 2023 (Exact Date)   Estimated body mass index is 44.14 kg/m² as calculated from the following:    Height as of 24: 152.4 cm (60\").    Weight as of 6/10/24: 103 kg (226 lb).    Ultrasound Impression:   See Viewpoint     Assessment and Plan     Fareed Garcia is a 29 y.o.  38w6d who presents today for gestational diabetes    Diagnoses and all orders for this visit:    1. Diet controlled gestational diabetes mellitus (GDM) in third trimester (Primary)  Assessment & Plan:  Today's ultrasound shows overall fetal growth at the 98th percentile and abdominal circumference greater than the 99th percentile with FARRUKH of " 28.  We discussed that given size, polyhydramnios, and diagnosis of gestational diabetes would recommend proceeding with delivery today.  Estimated fetal weight today measuring 4420 g and we did discuss that 4500 g is the recommendation for out right  section, though this would not be unreasonable to perform due to the proximity to 4500 g.  Dr. Lynch notified.           Follow Up  Recommend delivery today    I spent 10 minutes caring for the patient on the day of service. This included: obtaining or reviewing a separately obtained medical history, reviewing patient records, performing a medically appropriate exam and/or evaluation, counseling or educating the patient/family/caregiver, ordering medications, labs, and/or procedures and documenting such in the medical record. This does not include time spent on review and interpretation of other tests such as fetal ultrasound or the performance of other procedures such as amniocentesis or CVS.      Brad Mitchell MD, FACOG  Maternal Fetal Medicine, [facility] [place]

## 2024-06-20 NOTE — ANESTHESIA PROCEDURE NOTES
Spinal Block      Patient reassessed immediately prior to procedure    Patient location during procedure: OB  Performed By  Anesthesiologist: Maycol Levy DO  Preanesthetic Checklist  Completed: patient identified, IV checked, site marked, risks and benefits discussed, surgical consent, monitors and equipment checked, pre-op evaluation and timeout performed  Spinal Block Prep:  Patient Position:sitting  Sterile Tech:cap, gloves, mask and sterile barriers  Prep:Chloraprep  Patient Monitoring:blood pressure monitoring, continuous pulse oximetry and EKG    Spinal Block Procedure  Approach:midline  Guidance:landmark technique and palpation technique  Location:L3-L4  Needle Type:Keerthi  Needle Gauge:25 G  Placement of Spinal needle event:cerebrospinal fluid aspirated  Paresthesia: no  Fluid Appearance:clear     Post Assessment  Patient Tolerance:patient tolerated the procedure well with no apparent complications  Complications no

## 2024-06-20 NOTE — PROGRESS NOTES
"    Maternal/Fetal Medicine Consult Note   Date: 2024  Name: Fareed Garcia    : 1995     MRN: [id number]     Referring Provider: MARIOLA Givens    Chief Complaint  No chief complaint on file.    Subjective     History of Present Illness:  Fareed Garcia is a 29 y.o.  38w6d who presents today for gestational diabetes    BRANDYN: Estimated Date of Delivery: 24     ROS:   Otherwise Noted in HPI      Current Outpatient Medications:     Accu-Chek Guide test strip, , Disp: , Rfl:     Accu-Chek Softclix Lancets lancets, , Disp: , Rfl:     Blood Glucose Monitoring Suppl (Accu-Chek Guide) w/Device kit, , Disp: , Rfl:     ferrous sulfate 325 (65 FE) MG tablet, Take 1 tablet by mouth Daily With Breakfast., Disp: , Rfl:     prenatal vitamin (prenatal, CLASSIC, vitamin) tablet, Take 1 tablet by mouth Daily., Disp: , Rfl:     Objective     Vital Signs  LMP 2023 (Exact Date)   Estimated body mass index is 44.14 kg/m² as calculated from the following:    Height as of 24: 152.4 cm (60\").    Weight as of 6/10/24: 103 kg (226 lb).    Ultrasound Impression:   See Viewpoint     Assessment and Plan     Fareed Garcia is a 29 y.o.  38w6d who presents today for gestational diabetes    Diagnoses and all orders for this visit:    1. Diet controlled gestational diabetes mellitus (GDM) in third trimester (Primary)  Assessment & Plan:  Today's ultrasound shows overall fetal growth at the 98th percentile and abdominal circumference greater than the 99th percentile with FARRUKH of 28.  We discussed that given size, polyhydramnios, and diagnosis of gestational diabetes would recommend proceeding with delivery today.  Estimated fetal weight today measuring 4420 g and we did discuss that 4500 g is the recommendation for out right  section, though this would not be unreasonable to perform due to the proximity to 4500 g.  Dr. Lynch notified.           Follow Up  Recommend delivery today    I spent 10 " minutes caring for the patient on the day of service. This included: obtaining or reviewing a separately obtained medical history, reviewing patient records, performing a medically appropriate exam and/or evaluation, counseling or educating the patient/family/caregiver, ordering medications, labs, and/or procedures and documenting such in the medical record. This does not include time spent on review and interpretation of other tests such as fetal ultrasound or the performance of other procedures such as amniocentesis or CVS.      Brad Mitchell MD, FACOG  Maternal Fetal Medicine, [facility]

## 2024-06-20 NOTE — ANESTHESIA POSTPROCEDURE EVALUATION
Patient: Fareed Garcia    Procedure Summary       Date: 24 Room / Location: [facility] LABOR DELIVERY   [facility] DELIVERY    Anesthesia Start:  Anesthesia Stop:     Procedure:  SECTION PRIMARY (Abdomen) Diagnosis:     Surgeons: Lilly Hidalgo MD Provider: Maycol Levy DO    Anesthesia Type: ITN, spinal ASA Status: 3 - Emergent            Anesthesia Type: ITN, spinal    Vitals  Vitals Value Taken Time   /48 24 185   Temp 97.4 °F (36.3 °C) 24 185   Pulse 90 248   Resp 16 24 185   SpO2 96 % 24   Vitals shown include unfiled device data.        Post Anesthesia Care and Evaluation    Patient location during evaluation: bedside  Patient participation: complete - patient participated  Level of consciousness: awake  Pain score: 0  Pain management: satisfactory to patient    Airway patency: patent  Anesthetic complications: No anesthetic complications  PONV Status: none  Cardiovascular status: acceptable and hemodynamically stable  Respiratory status: acceptable  Hydration status: acceptable

## 2024-06-21 LAB
BASOPHILS # BLD AUTO: 0.04 10*3/MM3 (ref 0–0.2)
BASOPHILS NFR BLD AUTO: 0.3 % (ref 0–1.5)
DEPRECATED RDW RBC AUTO: 43.4 FL (ref 37–54)
EOSINOPHIL # BLD AUTO: 0 10*3/MM3 (ref 0–0.4)
EOSINOPHIL NFR BLD AUTO: 0 % (ref 0.3–6.2)
ERYTHROCYTE [DISTWIDTH] IN BLOOD BY AUTOMATED COUNT: 14.2 % (ref 12.3–15.4)
GLUCOSE BLDC GLUCOMTR-MCNC: NORMAL MG/DL
HCT VFR BLD AUTO: 26.3 % (ref 34–46.6)
HGB BLD-MCNC: 8.2 G/DL (ref 12–15.9)
IMM GRANULOCYTES # BLD AUTO: 0.16 10*3/MM3 (ref 0–0.05)
IMM GRANULOCYTES NFR BLD AUTO: 1.4 % (ref 0–0.5)
LYMPHOCYTES # BLD AUTO: 1.45 10*3/MM3 (ref 0.7–3.1)
LYMPHOCYTES NFR BLD AUTO: 12.4 % (ref 19.6–45.3)
MCH RBC QN AUTO: 26.5 PG (ref 26.6–33)
MCHC RBC AUTO-ENTMCNC: 31.2 G/DL (ref 31.5–35.7)
MCV RBC AUTO: 85.1 FL (ref 79–97)
MONOCYTES # BLD AUTO: 0.92 10*3/MM3 (ref 0.1–0.9)
MONOCYTES NFR BLD AUTO: 7.9 % (ref 5–12)
NEUTROPHILS NFR BLD AUTO: 78 % (ref 42.7–76)
NEUTROPHILS NFR BLD AUTO: 9.1 10*3/MM3 (ref 1.7–7)
NRBC BLD AUTO-RTO: 0 /100 WBC (ref 0–0.2)
PLATELET # BLD AUTO: 134 10*3/MM3 (ref 140–450)
PMV BLD AUTO: 12.6 FL (ref 6–12)
RBC # BLD AUTO: 3.09 10*6/MM3 (ref 3.77–5.28)
T PALLIDUM IGG SER QL: NORMAL
WBC NRBC COR # BLD AUTO: 11.67 10*3/MM3 (ref 3.4–10.8)

## 2024-06-21 PROCEDURE — 25010000002 PROMETHAZINE PER 50 MG: Performed by: OBSTETRICS & GYNECOLOGY

## 2024-06-21 PROCEDURE — 25010000002 KETOROLAC TROMETHAMINE PER 15 MG: Performed by: OBSTETRICS & GYNECOLOGY

## 2024-06-21 PROCEDURE — 85025 COMPLETE CBC W/AUTO DIFF WBC: CPT | Performed by: OBSTETRICS & GYNECOLOGY

## 2024-06-21 PROCEDURE — 25810000003 LACTATED RINGERS SOLUTION: Performed by: OBSTETRICS & GYNECOLOGY

## 2024-06-21 PROCEDURE — 25010000002 ENOXAPARIN PER 10 MG: Performed by: OBSTETRICS & GYNECOLOGY

## 2024-06-21 PROCEDURE — 25010000002 DIPHENHYDRAMINE PER 50 MG: Performed by: OBSTETRICS & GYNECOLOGY

## 2024-06-21 RX ORDER — DIPHENHYDRAMINE HYDROCHLORIDE 50 MG/ML
25 INJECTION INTRAMUSCULAR; INTRAVENOUS EVERY 6 HOURS PRN
Status: DISCONTINUED | OUTPATIENT
Start: 2024-06-21 | End: 2024-06-24 | Stop reason: HOSPADM

## 2024-06-21 RX ADMIN — KETOROLAC TROMETHAMINE 15 MG: 15 INJECTION, SOLUTION INTRAMUSCULAR; INTRAVENOUS at 06:06

## 2024-06-21 RX ADMIN — DIPHENHYDRAMINE HYDROCHLORIDE 25 MG: 50 INJECTION INTRAMUSCULAR; INTRAVENOUS at 00:36

## 2024-06-21 RX ADMIN — KETOROLAC TROMETHAMINE 15 MG: 15 INJECTION, SOLUTION INTRAMUSCULAR; INTRAVENOUS at 00:28

## 2024-06-21 RX ADMIN — DIPHENHYDRAMINE HYDROCHLORIDE 25 MG: 50 INJECTION INTRAMUSCULAR; INTRAVENOUS at 07:59

## 2024-06-21 RX ADMIN — ENOXAPARIN SODIUM 40 MG: 100 INJECTION SUBCUTANEOUS at 21:34

## 2024-06-21 RX ADMIN — PROMETHAZINE HYDROCHLORIDE 12.5 MG: 25 INJECTION INTRAMUSCULAR; INTRAVENOUS at 00:36

## 2024-06-21 RX ADMIN — ACETAMINOPHEN 650 MG: 325 TABLET ORAL at 23:11

## 2024-06-21 RX ADMIN — ACETAMINOPHEN 1000 MG: 500 TABLET ORAL at 16:56

## 2024-06-21 RX ADMIN — OXYCODONE HYDROCHLORIDE 10 MG: 10 TABLET ORAL at 12:08

## 2024-06-21 RX ADMIN — DIPHENHYDRAMINE HYDROCHLORIDE 25 MG: 50 INJECTION INTRAMUSCULAR; INTRAVENOUS at 14:19

## 2024-06-21 RX ADMIN — OXYCODONE HYDROCHLORIDE 10 MG: 10 TABLET ORAL at 23:49

## 2024-06-21 RX ADMIN — OXYCODONE HYDROCHLORIDE 10 MG: 10 TABLET ORAL at 18:02

## 2024-06-21 RX ADMIN — ACETAMINOPHEN 1000 MG: 500 TABLET ORAL at 10:00

## 2024-06-21 RX ADMIN — DOCUSATE SODIUM 100 MG: 100 CAPSULE, LIQUID FILLED ORAL at 21:35

## 2024-06-21 RX ADMIN — IBUPROFEN 600 MG: 600 TABLET, FILM COATED ORAL at 20:07

## 2024-06-21 RX ADMIN — ACETAMINOPHEN 1000 MG: 500 TABLET ORAL at 04:23

## 2024-06-21 RX ADMIN — OXYCODONE HYDROCHLORIDE 10 MG: 10 TABLET ORAL at 08:33

## 2024-06-21 RX ADMIN — KETOROLAC TROMETHAMINE 15 MG: 15 INJECTION, SOLUTION INTRAMUSCULAR; INTRAVENOUS at 12:02

## 2024-06-21 RX ADMIN — SODIUM CHLORIDE, POTASSIUM CHLORIDE, SODIUM LACTATE AND CALCIUM CHLORIDE 1000 ML: 600; 310; 30; 20 INJECTION, SOLUTION INTRAVENOUS at 06:15

## 2024-06-21 RX ADMIN — SIMETHICONE 80 MG: 80 TABLET, CHEWABLE ORAL at 21:35

## 2024-06-21 NOTE — PAYOR COMM NOTE
"Verona Morse (29 y.o. Female)     [place] Medicaid ID#[id number]    Delivery information.    From:Bijal Blake LPN, Utilization Review  Phone #[phone]  Fax #[phone]        Date of Birth   1995    Social Security Number       Address   [address] [place] [address]    Home Phone   [phone]    MRN   [id number]       Presybeterian   None    Marital Status                               Admission Date   6/20/24    Admission Type   Elective    Admitting Provider   Lilly Hidalgo MD    Attending Provider   Lilly Hidalgo MD    Department, Room/Bed   [facility] MOTHER BABY 4B, N436/1       Discharge Date       Discharge Disposition       Discharge Destination                                 Attending Provider: Lilly Hidalgo MD    Allergies: Coconut (Cocos Nucifera)    Isolation: None   Infection: None   Code Status: CPR    Ht: 152.4 cm (60\")   Wt: 104 kg (229 lb)    Admission Cmt: None   Principal Problem: Pregnancy [Z34.90]                   Active Insurance as of 6/20/2024       Primary Coverage       Payor Plan Insurance Group Employer/Plan Group    ANTHEM BLUE CROSS ANTHEM BLUE CROSS BLUE SHIELD PPO [id number]       Payor Plan Address Payor Plan Phone Number Payor Plan Fax Number Effective Dates    [address] [phone]  12/1/2022 - None Entered    [facility] [id number]         Subscriber Name Subscriber Birth Date Member ID       VERONA MORSE 1995 [id number]               Secondary Coverage       Payor Plan Insurance Group Employer/Plan Group    ANTHEM MEDICAID ANTHEM MEDICAID [id number]       Payor Plan Address Payor Plan Phone Number Payor Plan Fax Number Effective Dates    [address] [address] [phone]  3/1/2024 - None Entered    [facility] [address]         Subscriber Name Subscriber Birth Date Member ID       VERONA MORSE 1995 [id number]                     Emergency " Contacts        (Rel.) Home Phone Work Phone Mobile Phone    Darrion Garcia (Spouse) [phone] -- [phone]              Insurance Information                  ANTHEM BLUE CROSS/ANTHEM BLUE CROSS BLUE SHIELD PPO Phone: [phone]    Subscriber: Fareed Garcia Subscriber#: [id number]    Group#: [id number] Precert#: [id number]        ANTHEM MEDICAID/ANTHEM MEDICAID Phone: [phone]    Subscriber: Fareed Garcia Subscriber#: [id number]    Group#: [id number] Precert#: [id number]             History & Physical        Lilly Hidalgo MD at 24 1531          History and Physical:    Subjective    Chief Complaint   Patient presents with    Scheduled        Fareed Garcia is a 29 y.o. year old  with an Estimated Date of Delivery: 24 currently at 38w6d presenting with  gestational diabetes that appears to be uncontrolled.  Ultrasound today showed polyhydramnios and fetal macrosomia.  Dr. Mitchell from [facility] evaluated patient and felt delivery was warranted today due to concern for uncontrolled diabetes with ultrasound findings.  He recommended delivery. .    Prenatal care has been with Lilly Hidalgo MD.  It has been significant for diabetes (GDM A1) and obesity in pregnancy , polyhydramnios, fetal macrosomia.      Review of Systems   Constitutional: Negative.    HENT: Negative.     Respiratory: Negative.     Cardiovascular: Negative.    Gastrointestinal: Negative.    Genitourinary: Negative.    Musculoskeletal: Negative.    Skin: Negative.    Allergic/Immunologic: Negative.    Neurological: Negative.    Hematological: Negative.    Psychiatric/Behavioral: Negative.             Past Medical History:   Diagnosis Date    Abnormal Pap smear of cervix     ADHD (attention deficit hyperactivity disorder)     Anxiety 2024    Depression 2023    GERD (gastroesophageal reflux disease)     Gestational diabetes mellitus (GDM) 2024     Past  "Surgical History:   Procedure Laterality Date    BILATERAL BREAST REDUCTION      WISDOM TOOTH EXTRACTION       Family History   Problem Relation Age of Onset    Breast cancer Maternal Grandmother         Great Grandmother     Social History     Tobacco Use    Smoking status: Never    Smokeless tobacco: Never   Vaping Use    Vaping status: Never Used   Substance Use Topics    Alcohol use: No    Drug use: No     Medications Prior to Admission   Medication Sig Dispense Refill Last Dose    ferrous sulfate 325 (65 FE) MG tablet Take 1 tablet by mouth Daily With Breakfast.   Past Week    prenatal vitamin (prenatal, CLASSIC, vitamin) tablet Take 1 tablet by mouth Daily.   Past Week    Accu-Chek Guide test strip        Accu-Chek Softclix Lancets lancets        Blood Glucose Monitoring Suppl (Accu-Chek Guide) w/Device kit         Allergies:  Coconut (cocos nucifera)  OB History    Para Term  AB Living   2 0 0 0 1 0   SAB IAB Ectopic Molar Multiple Live Births   0 1 0 0 0 0      # Outcome Date GA Lbr Noe/2nd Weight Sex Type Anes PTL Lv   2 Current            1 IAB  5w0d             Obstetric Comments   Fob #1 - Pregnancy #1    Fob #2 - Pregnancy #2              Objective    /84 (BP Location: Left arm, Patient Position: Lying) Comment (Patient Position): green cuff  Pulse 110   Temp 98.4 °F (36.9 °C) (Oral)   Resp 20   Ht 152.4 cm (60\")   Wt 104 kg (229 lb)   LMP 2023 (Exact Date)   BMI 44.72 kg/m²     Physical Exam    General:  No acute distress   Lung: Clear to auscultation bilaterally, no wheezing, clear at bases   Heart: Regular rate and rhythm, no murmurs    Abdomen: Gravid, nontender   Extremities: 2+ DTR's bilaterally, no edema   FHT's: reactive    Cervix: deferred   [place]: Contraction are irregular           Lab Review   External Prenatal Results       Pregnancy Outside Results - Transcribed From Office Records - See Scanned Records For Details       Test Value Date Time    ABO  O  " 11/28/23 1120    Rh  Positive  11/28/23 1120    Antibody Screen  Negative  04/18/24 1053       Negative  11/28/23 1120    Varicella IgG       Rubella  1.48 index 11/28/23 1120    Hgb  10.7 g/dL 04/18/24 1053       12.4 g/dL 11/28/23 1120    Hct  32.3 % 04/18/24 1053       37.8 % 11/28/23 1120    HgB A1c   5.6 % 01/04/24 1501       6.2 % 11/28/23 1120    1h GTT  173 mg/dL 12/22/23 1105    3h GTT Fasting  80 mg/dL 01/02/24 1517    3h GTT 1 hour  217 mg/dL 01/02/24 1517    3h GTT 2 hour  224 mg/dL 01/02/24 1517    3h GTT 3 hour   198 mg/dL 01/02/24 1517    Gonorrhea (discrete)  Negative  11/28/23 1120    Chlamydia (discrete)  Negative  11/28/23 1120    RPR  Non Reactive  04/18/24 1053       Non Reactive  11/28/23 1120    Syphilis Antibody       HBsAg  Negative  11/28/23 1120    Herpes Simplex Virus PCR       Herpes Simplex VIrus Culture       HIV  Non Reactive  11/28/23 1120    Hep C RNA Quant PCR       Hep C Antibody  Non Reactive  11/28/23 1120    AFP       NIPT       Cystic Fibroisis        Group B Strep ^ Positive  11/28/23     GBS Susceptibility to Clindamycin       GBS Susceptibility to Erythromycin       Fetal Fibronectin       Genetic Testing, Maternal Blood                 Drug Screening       Test Value Date Time    Urine Drug Screen       Amphetamine Screen       Barbiturate Screen       Benzodiazepine Screen       Methadone Screen       Phencyclidine Screen       Opiates Screen       THC Screen       Cocaine Screen       Propoxyphene Screen       Buprenorphine Screen       Methamphetamine Screen       Oxycodone Screen       Tricyclic Antidepressants Screen                 Legend    ^: Historical                                Lab Results   Component Value Date    ALKPHOS 72 01/29/2019    ALT 16 01/29/2019    AST 21 01/29/2019    CREATININE 0.80 01/29/2019    BILITOT 0.7 01/29/2019     Lab Results   Component Value Date    WBC 8.29 06/20/2024    HGB 10.4 (L) 06/20/2024    HCT 32.5 (L) 06/20/2024    MCV 82.9  2024     2024        Results for orders placed in visit on 24    US Ob Follow Up Transabdominal Approach    Narrative  PAT NAME: VERONA MORSE  MED REC#: [id number]  BIRTH DA: 1995  PAT GEND: F  ACCOUNT#: [id number]  PAT TYPE: O  EXAM JELLY: [id number]  REF PHYS MARÍA SPARROW  ACCESSION [id number]      Sonographer Comments  ====================    anatomy scan complete appears wnl      Indication  ========    f/u anatomy scan ; Obesity      Comparison Studies  =================    The findings of this study are compared to the prior ultrasound study dated 2/15/2024      Method  =======    [place] E6, Transabdominal ultrasound examination      Pregnancy  =========    [place] pregnancy. Number of fetuses: 1      Dating  ======    LMP on: 2023  GA by LMP 24 w + 3 d  BRANDYN by LMP: 2024  Method of dating: based on stated BRANDYN  GA by prior assessment 24 w + 3 d  BRANDYN by prior assessment: 2024  Ultrasound examination on: 3/11/2024  GA by U/S based upon: AC, BPD, Femur, HC  GA by U/S 23 w + 6 d  BRANDYN by U/S: 2024  Previous dating: based on stated BRANDYN, selected on 02/15/2024  Agreed BRANDYN of previous datin2024  Assigned: based on stated BRANDYN, selected on 2024  Assigned GA 24 w + 3 d  Assigned BRANDYN: 2024  Pregnancy length 280 d      General Evaluation  ================    Cardiac activity present.  bpm.  Fetal movements visualized.  Presentation breech.  Placenta Placental site: anterior.  Umbilical cord Cord vessels: 3 vessel cord.  Amniotic fluid Amount of AF: normal.      Fetal Biometry  ============    Standard  BPD 57.8 mm  23w 5d        19%        Hadlock    .3 mm  24w 0d        16%        Hadlock    .0 mm  23w 1d        9%        Hadlock    Femur 44.2 mm  24w 4d        41%        Hadlock    HC / AC 1.20     g  34%        Javy    EFW (lb) 1 lb  EFW (oz) 6 oz  EFW by: Hadlock (BPD-HC-AC-FL)  Extremities / Bony Struc  FL /  BPD 0.76    FL / HC 0.20    FL / AC 0.24    Other Structures   bpm      Fetal Anatomy  ============    Lateral ventricles: Appears normal  Lips: Appear normal  Profile: Appears normal  Nose: Appears normal  4-chamber view: Appears normal  RVOT view: Appears normal  LVOT view: Appears normal  Heart / Thorax  3-vessel view: Appears normal  3-vessel-trachea view: Appears normal  Diaphragm: Appears normal  Stomach: Appears normal  Kidneys: Appears normal  Bladder: Appears normal  Gender: female  Wants to know gender: yes      Impression  ==========    Female infant in breech presentation fetal heart rate of 148. Intraplacental and three-vessel cord noted. Normal fluid volume. Estimated fetal weight 1 pound 6 ounces  which is 34th percentile. Of note AC is measuring less than 10th percentile at 9th percentile. Heart views seen today within normal limits. Lips and nose seen today and  appear normal as well. Anatomy scan now complete.      Recommendation  ===============    Recommend PDC evaluation of fetal abdomen.        Sonographer: ERIC Henry, RT R, [facility]  Physician: Lilly Hidalgo MD, FACOG    Electronically signed by: Lilly Hidalgo MD, FACOG at: 2024/03/19 16:03              Patient Active Problem List    Diagnosis Date Noted    *Pregnancy 06/20/2024    Maternal anemia in pregnancy, antepartum 04/22/2024     Note Last Updated: 4/22/2024     Hemoglobin 10.7 at 30 weeks.  Advised iron supplementation      Fetal cardiac anomaly complicating pregnancy, antepartum 03/22/2024     Note Last Updated: 5/20/2024     Possible VSD seen with PDC.    Not seen at 34 weeks.  They recommend follow-up at 38 weeks.  Next follow-up on 6/18    Recommend fetal echocardiogram after delivery.      Backache 01/04/2024    Anxiety 01/04/2024    Fatigue 01/04/2024    Joint pain 01/04/2024    Diet controlled gestational diabetes mellitus (GDM) in third trimester 01/03/2024     Note Last Updated: 5/2/2024     Abnormal 3 hr OGTT @ 14w 4d;    referral to endocrinology 1/3/24  Sends blood sugars to dinorah every week  At 25 weeks and 4 days-Dr. Rios recommended starting insulin and a Dexcom.  At 29 weeks and 6 days-patient reports she never started insulin.  She reports that she has been controlling this with her diet.  2024-at 31 weeks and 6 days-patient compliant with sending in blood sugars.  Good control with diet alone.      Positive GBS test 2023     Note Last Updated: 2023     Seen at urine at new OB visit      Prenatal care, antepartum 2023     Note Last Updated: 2024     IOL  at 9 PM      Depression 2023     Note Last Updated: 2023     Following with psych counselor.  Considering medicine after first trimester.      Obesity in pregnancy, antepartum 2023     Note Last Updated: 2024     Hemoglobin A1c at new OB 6.2  Early Glucola 173  Baby aspirin weeks 12 through 36  Growth ultrasound at 32 and 37 weeks  Discussed carbohydrate control, daily exercise, water intake.      History of abnormal cervical Pap smear 2022     Note Last Updated: 2022-ASCUS.  HPV negative.  Recommend follow-up in 1 year.          Assessment & Plan    ASSESSMENT  IUP at 38w6d  A1 diabetes -poorly controlled  Fetal macrosomia-discussed delivery options.  Patient concern for shoulder dystocia and desires out right  section due to fetus projected to measure 9 pounds 12 ounces.  Polyhydramnios  Obesity in pregnancy   3. GBBS positive    PLAN  Admit to labor and delivery   2.   Proceed with  section         Lilly Hidalgo MD  2024@    Electronically signed by Lilly Hidalgo MD at 24 1908       Facility-Administered Medications as of 2024   Medication Dose Route Frequency Provider Last Rate Last Admin    [COMPLETED] acetaminophen (TYLENOL) tablet 1,000 mg  1,000 mg Oral Once Lilly Hidalgo MD   1,000 mg at 24 1622    acetaminophen  (TYLENOL) tablet 1,000 mg  1,000 mg Oral Q6H Lilly Hidalgo MD   1,000 mg at 06/21/24 0423    Followed by    acetaminophen (TYLENOL) tablet 650 mg  650 mg Oral Q6H Lilly Hidalgo MD        aluminum-magnesium hydroxide-simethicone (MAALOX MAX) 400-400-40 MG/5ML suspension 15 mL  15 mL Oral Q4H PRN Lilly Hidalgo MD        Or    calcium carbonate (TUMS) chewable tablet 500 mg (200 mg elemental)  1 tablet Oral Q4H PRN Lilly Hidalgo MD        carboprost (HEMABATE) injection 250 mcg  250 mcg Intramuscular PRN Lilly Hidalgo MD        [COMPLETED] ceFAZolin 3000 mg IVPB in 100 mL NS (MBP)  3,000 mg Intravenous Once Lilly Hidalgo MD   3,000 mg at 06/20/24 1741    diphenhydrAMINE (BENADRYL) injection 25 mg  25 mg Intravenous Q6H PRN Giancarlo Zuniga MD   25 mg at 06/21/24 0036    docusate sodium (COLACE) capsule 100 mg  100 mg Oral BID PRN Lilly Hidalgo MD        Enoxaparin Sodium (LOVENOX) syringe 40 mg  40 mg Subcutaneous Q12H Lilly Hidalgo MD        Hydrocortisone (Perianal) (ANUSOL-HC) 2.5 % rectal cream 1 Application  1 Application Rectal PRN Lilly Hidalgo MD        HYDROmorphone (DILAUDID) injection 0.5 mg  0.5 mg Intravenous Q2H PRN Giancarlo Zuniga MD   0.5 mg at 06/20/24 2127    ketorolac (TORADOL) injection 15 mg  15 mg Intravenous Q6H Lilly Hidalgo MD   15 mg at 06/21/24 0606    Followed by    [START ON 6/22/2024] ibuprofen (ADVIL,MOTRIN) tablet 600 mg  600 mg Oral Q6H Lilly Hidalgo MD        [COMPLETED] ketorolac (TORADOL) injection 30 mg  30 mg Intravenous Once Lilly Hidalgo MD   30 mg at 06/20/24 1954    [COMPLETED] lactated ringers bolus 1,000 mL  1,000 mL Intravenous Once Lilly Hidalgo MD 2,000 mL/hr at 06/20/24 1553 1,000 mL at 06/20/24 1553    lactated ringers bolus 1,000 mL  1,000 mL Intravenous Once Giancarlo Zuniga MD 1,000 mL/hr at 06/21/24 0615 1,000 mL at 06/21/24 0615    lactated ringers  infusion  125 mL/hr Intravenous Continuous Lilly Hidalgo  mL/hr at 24 125 mL/hr at 24    lanolin topical 1 Application  1 Application Topical Q1H PRN Lilly Hidalgo MD        methylergonovine (METHERGINE) injection 200 mcg  200 mcg Intramuscular PRN Lilly Hidalgo MD        miSOPROStol (CYTOTEC) tablet 600 mcg  600 mcg Vaginal PRN Lilly Hidalgo MD        ondansetron (ZOFRAN) injection 4 mg  4 mg Intravenous Q6H PRN Maycol Levy DO   4 mg at 24    ondansetron ODT (ZOFRAN-ODT) disintegrating tablet 4 mg  4 mg Oral Q8H PRN Lilly Hidalgo MD        oxyCODONE (ROXICODONE) immediate release tablet 5 mg  5 mg Oral Q4H PRN Lilly Hidalgo MD        Or    oxyCODONE (ROXICODONE) immediate release tablet 10 mg  10 mg Oral Q4H PRN Lilly Hidalgo MD        [COMPLETED] oxytocin (PITOCIN) 30 units in 0.9% sodium chloride 500 mL (premix)  999 mL/hr Intravenous Once Lilly Hidalgo  mL/hr at 24 999 mL/hr at 24    Followed by    [] oxytocin (PITOCIN) 30 units in 0.9% sodium chloride 500 mL (premix)  250 mL/hr Intravenous Continuous Lilly Hidalgo MD        oxytocin (PITOCIN) 30 units in 0.9% sodium chloride 500 mL (premix)  125 mL/hr Intravenous Once PRN Lilly Hidalgo MD        prenatal vitamin tablet 1 tablet  1 tablet Oral Daily Lilly Hidalgo MD        [COMPLETED] promethazine (PHENERGAN) 12.5 mg in sodium chloride 0.9 % 50 mL  12.5 mg Intravenous Q6H PRN Giancarlo Zuniga MD   12.5 mg at 24 0036    promethazine (PHENERGAN) tablet 25 mg  25 mg Oral Q6H PRN Lilly Hidalgo MD        Or    promethazine (PHENERGAN) suppository 12.5 mg  12.5 mg Rectal Q6H PRN Lilly Hidalgo MD        simethicone (MYLICON) chewable tablet 80 mg  80 mg Oral 4x Daily PRN Lilly Hidalgo MD        [COMPLETED] Sod Citrate-Citric Acid (BICITRA) oral solution 30 mL   "30 mL Oral Once Lilly Hidalgo MD   30 mL at 06/20/24 1741    sodium chloride 0.9 % bolus 1,000 mL  1,000 mL Intravenous Once Lilly Hidalgo MD        sodium chloride 0.9 % flush 1-10 mL  1-10 mL Intravenous PRN Lilly Hidalgo MD        sodium chloride 0.9 % flush 10 mL  10 mL Intravenous Q12H Lilly Hidalgo MD        sodium chloride 0.9 % infusion 40 mL  40 mL Intravenous PRN Lilly Hidalgo MD         Orders (last 24 hrs)        Start     Ordered    06/22/24 0100  ibuprofen (ADVIL,MOTRIN) tablet 600 mg  Every 6 Hours        Placed in \"Followed by\" Linked Group    06/20/24 2028 06/21/24 2200  acetaminophen (TYLENOL) tablet 650 mg  Every 6 Hours        Placed in \"Followed by\" Linked Group    06/20/24 2028 06/21/24 2100  Enoxaparin Sodium (LOVENOX) syringe 40 mg  Every 12 Hours         06/20/24 2028 06/21/24 0900  prenatal vitamin tablet 1 tablet  Daily         06/20/24 2028 06/21/24 0800  Ambulate Patient  2 Times Daily      Comments: After anesthesia wears off.    06/20/24 2028 06/21/24 0700  lactated ringers bolus 1,000 mL  Once         06/21/24 0613    06/21/24 0600  Discontinue Indwelling Urinary Catheter in AM  Once         06/20/24 2028 06/21/24 0600  CBC & Differential  Timed         06/20/24 2028 06/21/24 0600  CBC Auto Differential  PROCEDURE ONCE         06/20/24 2202 06/21/24 0100  ketorolac (TORADOL) injection 15 mg  Every 6 Hours        Placed in \"Followed by\" Linked Group    06/20/24 2028 06/21/24 0017  diphenhydrAMINE (BENADRYL) injection 25 mg  Every 6 Hours PRN         06/21/24 0018    06/21/24 0016  promethazine (PHENERGAN) 12.5 mg in sodium chloride 0.9 % 50 mL  Every 6 Hours PRN         06/21/24 0018    06/20/24 2236  POC Glucose Once  PROCEDURE ONCE        Comments: Complete no more than 45 minutes prior to patient eating      06/20/24 2234    06/20/24 2201  acetaminophen (TYLENOL) tablet 1,000 mg  Every 6 Hours        Placed in " "\"Followed by\" Linked Group    06/20/24 2028 06/20/24 2115  sodium chloride 0.9 % flush 10 mL  Every 12 Hours Scheduled         06/20/24 2028 06/20/24 2115  sodium chloride 0.9 % bolus 1,000 mL  Once         06/20/24 2028 06/20/24 2100  sodium chloride 0.9 % flush 10 mL  Every 12 Hours Scheduled,   Status:  Discontinued         06/20/24 1530    06/20/24 2100  Incentive spirometry RT  Every Hour       06/20/24 2028 06/20/24 2058  HYDROmorphone (DILAUDID) injection 0.5 mg  Every 2 Hours PRN         06/20/24 2100 06/20/24 2029  Code Status and Medical Interventions:  Continuous         06/20/24 2028 06/20/24 2029  Vital Signs Per hospital policy  Per Hospital Policy         06/20/24 2028 06/20/24 2029  Notify Physician  Until Discontinued         06/20/24 2028 06/20/24 2029  Up As Tolerated  Until Discontinued         06/20/24 2028 06/20/24 2029  Patient May Shower  Once        Comments: After anesthesia wears off and with assistance    06/20/24 2028 06/20/24 2029  Diet: Regular/House; Fluid Consistency: Thin (IDDSI 0)  Diet Effective Now         06/20/24 2028 06/20/24 2029  Advance Diet As Tolerated -Regular  Until Discontinued         06/20/24 2028 06/20/24 2029  I/O  Every Shift       06/20/24 2028 06/20/24 2029  Fundal and Lochia Check  Per Hospital Policy        Comments: Q 30 min x 2, Q 1 hr x 4, Q 4 hrs x 24 hrs, then Q shift.    06/20/24 2028 06/20/24 2029  Continue Indwelling Urinary Catheter Already in Place  Once         06/20/24 2028 06/20/24 2029  Notify Provider if Bladder Distention Continues  Until Discontinued         06/20/24 2028 06/20/24 2029  Turn Cough Deep Breathe  Once         06/20/24 2028 06/20/24 2029  Encourage early intake of PO fluids  Continuous         06/20/24 2028 06/20/24 2029  Ambulate Patient 3-5 times per day (with or without Kuhn)  Every Shift       06/20/24 2028 06/20/24 2029  Apply Abdominal Binding Until Discontinued  " "Until Discontinued         06/20/24 2028 06/20/24 2029  \"If patient tolerates food and liquids after completion of second bag of Pitocin, saline lock IV and discontinue IV fluid infusions.  Once         06/20/24 2028 06/20/24 2029  Breast pump to bed  Once         06/20/24 2028 06/20/24 2029  If indicated -- Please administer RH Immunoglobulin based on results of cord blood evaluation and fetal screen lab tests, pharmacy to dispense  Per Order Details        Comments: See Process Instructions For Reference Range Details.    06/20/24 2028 06/20/24 2029  Insert Peripheral IV  Once         06/20/24 2028 06/20/24 2029  Saline Lock & Maintain IV Access  Continuous         06/20/24 2028 06/20/24 2029  Place Sequential Compression Device  Once         06/20/24 2028 06/20/24 2029  Maintain Sequential Compression Device  Continuous         06/20/24 2028 06/20/24 2028  sodium chloride 0.9 % flush 1-10 mL  As Needed         06/20/24 2028 06/20/24 2028  sodium chloride 0.9 % infusion 40 mL  As Needed         06/20/24 2028 06/20/24 2028  oxytocin (PITOCIN) 30 units in 0.9% sodium chloride 500 mL (premix)  Once As Needed         06/20/24 2028 06/20/24 2028  oxyCODONE (ROXICODONE) immediate release tablet 5 mg  Every 4 Hours PRN        Placed in \"Or\" Linked Group    06/20/24 2028 06/20/24 2028  oxyCODONE (ROXICODONE) immediate release tablet 10 mg  Every 4 Hours PRN        Placed in \"Or\" Linked Group    06/20/24 2028 06/20/24 2028  docusate sodium (COLACE) capsule 100 mg  2 Times Daily PRN         06/20/24 2028 06/20/24 2028  simethicone (MYLICON) chewable tablet 80 mg  4 Times Daily PRN         06/20/24 2028 06/20/24 2028  promethazine (PHENERGAN) tablet 25 mg  Every 6 Hours PRN        Placed in \"Or\" Linked Group    06/20/24 2028 06/20/24 2028  promethazine (PHENERGAN) suppository 12.5 mg  Every 6 Hours PRN        Placed in \"Or\" Linked Group    06/20/24 2028 06/20/24 2028  " "lanolin topical 1 Application  Every 1 Hour PRN         06/20/24 2028 06/20/24 2028  carboprost (HEMABATE) injection 250 mcg  As Needed         06/20/24 2028 06/20/24 2028  miSOPROStol (CYTOTEC) tablet 600 mcg  As Needed         06/20/24 2028 06/20/24 2028  methylergonovine (METHERGINE) injection 200 mcg  As Needed         06/20/24 2028 06/20/24 2028  Hydrocortisone (Perianal) (ANUSOL-HC) 2.5 % rectal cream 1 Application  As Needed         06/20/24 2028 06/20/24 2028  aluminum-magnesium hydroxide-simethicone (MAALOX MAX) 400-400-40 MG/5ML suspension 15 mL  Every 4 Hours PRN        Placed in \"Or\" Linked Group    06/20/24 2028 06/20/24 2028  calcium carbonate (TUMS) chewable tablet 500 mg (200 mg elemental)  Every 4 Hours PRN        Placed in \"Or\" Linked Group    06/20/24 2028 06/20/24 2018  ondansetron (ZOFRAN) injection 4 mg  Every 6 Hours PRN         06/20/24 2018 06/20/24 2015  oxytocin (PITOCIN) 30 units in 0.9% sodium chloride 500 mL (premix)  Continuous        Placed in \"Followed by\" Linked Group    06/20/24 1901 06/20/24 2000  ketorolac (TORADOL) injection 30 mg  Once         06/20/24 1901 06/20/24 2000  Respirations  Every Hour      Comments: If respiratory rate is less than 10/min, notify the Anesthesiologist    06/20/24 1919 06/20/24 1920  Oxygen Therapy- Nasal Cannula; 2 LPM  Continuous,   Status:  Canceled         06/20/24 1919 06/20/24 1920  If Respiratory Rate is Less Than 8/Min, See Narcan Order. Notify Anesthesiologist STAT.  Continuous         06/20/24 1919 06/20/24 1920  Blood Pressure and Pulse Every 4 Hours  Continuous,   Status:  Canceled         06/20/24 1919 06/20/24 1920  Activity & Removal of Kuhn Catheter, Per Obstetrician  Continuous,   Status:  Canceled         06/20/24 1919 06/20/24 1920  Notify Anesthesiologist for Any Questions / Problems  Continuous,   Status:  Canceled         06/20/24 1919 06/20/24 1920  Notify Anesthesia for Temp Over " "101.4F  Continuous,   Status:  Canceled         06/20/24 1919 06/20/24 1920  Ambu Bag at Bedside  Continuous         06/20/24 1919 06/20/24 1919  ondansetron ODT (ZOFRAN-ODT) disintegrating tablet 4 mg  Every 8 Hours PRN         06/20/24 1919 06/20/24 1919  HYDROmorphone (DILAUDID) injection 0.5 mg  Every 30 Minutes PRN,   Status:  Discontinued         06/20/24 1919 06/20/24 1902  Notify Physician (specified)  Until Discontinued         06/20/24 1901 06/20/24 1902  Vital Signs Per hospital policy  Per Hospital Policy         06/20/24 1901 06/20/24 1902  Strict Bed Rest  Until Discontinued         06/20/24 1901 06/20/24 1902  Strict Intake and Output  Every Shift       06/20/24 1901 06/20/24 1902  Fundal & Lochia Check  Per Order Details        Comments: Every 15 Minutes x4, Then Every 30 Minutes x2, Then Every Shift    06/20/24 1901 06/20/24 1902  Fundal & Lochia Check  Every Shift       06/20/24 1901 06/20/24 1902  Indwelling Urinary Catheter  Once,   Status:  Canceled         06/20/24 1901 06/20/24 1902  Diet: Regular/House; Fluid Consistency: Thin (IDDSI 0)  Diet Effective Now,   Status:  Canceled         06/20/24 1901 06/20/24 1902  Advance Diet As Tolerated -  Until Discontinued         06/20/24 1901 06/20/24 1901  oxytocin (PITOCIN) 30 units in 0.9% sodium chloride 500 mL (premix)  Once        Placed in \"Followed by\" Linked Group    06/20/24 1901 06/20/24 1901  HYDROmorphone (DILAUDID) injection 0.5 mg  Every 2 Hours PRN,   Status:  Discontinued         06/20/24 1901 06/20/24 1901  methylergonovine (METHERGINE) injection 200 mcg  As Needed,   Status:  Discontinued         06/20/24 1901 06/20/24 1901  carboprost (HEMABATE) injection 250 mcg  As Needed,   Status:  Discontinued         06/20/24 1901 06/20/24 1901  miSOPROStol (CYTOTEC) tablet 800 mcg  As Needed,   Status:  Discontinued         06/20/24 1901    06/20/24 1857  Transfer Patient  Once         " 06/20/24 1900    06/20/24 1848  Blood Gas, Venous, Cord  PROCEDURE ONCE         06/20/24 1847    06/20/24 1847  Blood Gas, Arterial, Cord  PROCEDURE ONCE         06/20/24 1846    06/20/24 1637  Fentanyl, Urine - Urine, Clean Catch  Once         06/20/24 1636    06/20/24 1630  lactated ringers bolus 1,000 mL  Once         06/20/24 1530    06/20/24 1630  lactated ringers infusion  Continuous         06/20/24 1530    06/20/24 1630  Sod Citrate-Citric Acid (BICITRA) oral solution 30 mL  Once         06/20/24 1530    06/20/24 1630  acetaminophen (TYLENOL) tablet 1,000 mg  Once         06/20/24 1530    06/20/24 1615  ceFAZolin 3000 mg IVPB in 100 mL NS (MBP)  Once         06/20/24 1530    06/20/24 1614  ABO RH Specimen Verification  STAT         06/20/24 1613    06/20/24 1600  Vital Signs q 4 while awake  Every 4 Hours      Comments: While the patient is awake.    06/20/24 1530    06/20/24 1532  CBC (No Diff)  STAT         06/20/24 1531    06/20/24 1532  Type & Screen  STAT         06/20/24 1531    06/20/24 1531  Lactate Dehydrogenase  Once         06/20/24 1530    06/20/24 1531  Uric Acid  Once         06/20/24 1530    06/20/24 1531  Comprehensive Metabolic Panel  Once         06/20/24 1530    06/20/24 1530  Admit To Obstetrics Inpatient  Once         06/20/24 1530    06/20/24 1530  Code Status and Medical Interventions:  Continuous,   Status:  Canceled         06/20/24 1530    06/20/24 1530  Obtain Informed Consent  Once         06/20/24 1530    06/20/24 1530  Vital Signs Per hospital policy  Per Hospital Policy         06/20/24 1530    06/20/24 1530  Continuous Fetal Monitoring With NST on Admission and Prior to Initiation of Oxytocin.  Per Order Details        Comments: Continuous Fetal Monitoring With NST on Admission    06/20/24 1530    06/20/24 1530  External Uterine Contraction Monitoring  Per Hospital Policy         06/20/24 1530    06/20/24 1530  Notify Physician (specified)  Until Discontinued         06/20/24  "1530    06/20/24 1530  Notify physician for hyperstimulus (per hospital algorithm)  Until Discontinued         06/20/24 1530 06/20/24 1530  Notify physician if membranes ruptured, bleeding greater than 1 pad an hour, fetal heart tone abnormality, and severe pain  Until Discontinued         06/20/24 1530 06/20/24 1530  May Ambulate  Until Discontinued        Comments: If membrane intact, or head engaged with ruptured BOW or if tracing was normal for 20 minutes.    06/20/24 1530 06/20/24 1530  Insert Indwelling Urinary Catheter  Once,   Status:  Canceled        Placed in \"And\" Linked Group    06/20/24 1530    06/20/24 1530  Assess Need for Indwelling Urinary Catheter - Follow Removal Protocol  Continuous,   Status:  Canceled        Comments: Indwelling Urinary Catheter Removal Criteria  Discontinue Indwelling Urinary Catheter Unless One of the Following is Present  Urinary Retention or Obstruction  Chronic Kuhn Catheter Use  End of Life  Critical Illness with Strict I/O   Tract or Abdominal Surgery  Stage 3/4 Sacral / Perineal Wound  Required Activity Restriction: Trauma  Required Activity Restriction: Spine Surgery  If Patient is Being Followed by Urology Contact Them PRIOR to Removal  Do Not Remove Indwelling Urinary Catheter Order is Present with a CLINICAL REASON to Maintain the Catheter. Provider is Required to Include a Clinical Reason to Maintain a Urinary Catheter    Chronic Kuhn Catheter Use (Present on Admission)  Assess for Continued Need & Document Medical Necessity  If Infection is Suspected, Contact the Provider       Placed in \"And\" Linked Group    06/20/24 1530    06/20/24 1530  Urinary Catheter Care  Every Shift,   Status:  Canceled      Placed in \"And\" Linked Group    06/20/24 1530    06/20/24 1530  Abdominal Prep with Clippers  Once         06/20/24 1530    06/20/24 1530  SCD (sequential compression devices)  Once         06/20/24 1530    06/20/24 1530  Document Gatorade Consumption " Prior to Admission (Yes or No)  Once         06/20/24 1530    06/20/24 1530  NPO Diet NPO Type: Ice Chips  Diet Effective Now,   Status:  Canceled         06/20/24 1530    06/20/24 1530  Inpatient Consult to Anesthesiology  Once        Specialty:  Anesthesiology  Provider:  (Not yet assigned)    06/20/24 1530    06/20/24 1530  T Pallidum Antibody w/ reflex RPR (Syphilis)  Once         06/20/24 1530    06/20/24 1530  Urine Drug Screen - Urine, Clean Catch  Once         06/20/24 1530    06/20/24 1530  Insert Peripheral IV  Once         06/20/24 1530    06/20/24 1530  Saline Lock & Maintain IV Access  Continuous,   Status:  Canceled         06/20/24 1530    06/20/24 1529  sodium chloride 0.9 % flush 10 mL  As Needed,   Status:  Discontinued         06/20/24 1530    06/20/24 1529  sodium chloride 0.9 % infusion 40 mL  As Needed,   Status:  Discontinued         06/20/24 1530    06/20/24 1529  lidocaine PF 1% (XYLOCAINE) injection 0.5 mL  Once As Needed,   Status:  Discontinued         06/20/24 1530    Unscheduled  IV Site Care  As Needed       06/20/24 1919    Unscheduled  Blood Gas, Arterial -With Co-Ox Panel: Yes  As Needed       06/20/24 1919    Unscheduled  Bladder Scan if Patient Unable to Void 4-6 Hours After Catheter Removal  As Needed         06/20/24 2028    Unscheduled  Straight Cath Every 4-6 Hours As Needed If Patient is Unable to Void After 4-6 Hours, Bladder Scan Volume is Greater Than 350-500mL & Patient Has Symptoms of Bladder Discomfort / Distention  As Needed       06/20/24 2028    Unscheduled  Schedule / Prompt Voiding For Patients With Urinary Incontinence  As Needed       06/20/24 2028    Unscheduled  Wound Care  As Needed      Comments: Postop day 1. Remove dressing and leave incision open to air.    06/20/24 2028    Unscheduled  Chewing Gum  As Needed       06/20/24 2028    Unscheduled  Warm compress  As Needed       06/20/24 2028    Unscheduled  Apply ace wrap, tight bra, or binder  As Needed        24    Unscheduled  Apply ice packs  As Needed       24    Signed and Held  Urinary Catheter Care  Every Shift       Signed and Held                     Operative/Procedure Notes (last 24 hours)        Lilly Hidalgo MD at 24 1901          Please see op note    Electronically signed by Lilly Hidalgo MD at 24 190       Lilly Hidalgo MD at 24 1805          [facility]   Section Operative Note    Pre-Operative Dx:   1.  IUP at 38w6d  weeks     2. Macrosomia  Unfavorable cervix  Polyhydramnios  Poorly controlled gestational diabetes      Postoperative dx:    1.  Same     Procedure: Procedure(s):   SECTION PRIMARY   Surgeon: Lilly Hidaglo MD    Assistant: Surgeon(s):  Lilly Hidalgo MD        Anesthesia:     EBL:   mls.  <500ml mls.         IV Fluids: 1200 mls.   UOP: 25 mls.       Antibiotics: cefazolin (Ancef)     Infant:            Gender: female  infant    Weight: No birth weight on file.     Apgars: 7  @ 1 minute /     8  @ 5 minutes    Fetal presentation: cephalic   Amniotic fluid: Meconium stained, copious amount      Complications:   None      Disposition:   Mother to Mother Baby/Postpartum  in stable condition currently.   Baby to NICU  in stable condition currently.   Indication: Patient being followed for gestational diabetes in pregnancy.  Ultrasound by  diagnostic center on 2024 was shown fetal macrosomia along with polyhydramnios.  Due to concern for this being an indication of uncontrolled gestational diabetes, PDC recommended delivery today.  In discussion with patient her cervix was not favorable and there is a concern for fetal macrosomia with an abdominal circumference it is out of range and baby measuring 9 pounds 12 ounces.  Patient elected for an out right  section for delivery.    Procedure:   The patient was taken to the operating room where spinal anesthesia was placed, and  she was placed in supine position with a leftward tilt. Sequential compression devices on bilateral lower extremities and a Kuhn catheter placed in her bladder. After being prepped and draped in a normal sterile fashion, a Pfannenstiel skin incision was made with a scalpel and taken down to the level of the fascia using the Bovie. The fascia was incised in the midline and extended laterally. The superior edge of the fascia was grasped with Kocher clamps, elevated and the rectus muscle dissected off. In a similar fashion, the inferior edge of the fascia was grasped with Kocher clamps, elevated and the rectus muscles dissected off. The rectus muscles were bisected in the midline. The peritoneum was identified, grasped and entered into sharply using Metzenbaum scissors. This incision was extended superiorly and inferiorly with good visualization of the bladder. Bladder blade was placed. A bladder flap was created. A low transverse uterine incision was made with a scalpel and extended laterally. Amniotomy with meconium stained fluid occurred at the time of hysterotomy. The head was brought to the uterine incision, and using fundal pressure, the head delivered without complication. Nose and mouth were bulb suctioned.  Shoulders and body were to follow.  After 1 minute the cord was clamped x2 and cut. Infant was handed to the awaiting pediatric team.  Cord blood and gases were obtained. The placenta was manually extracted. The uterus was exteriorized and cleared of all clot and debris and the hysterotomy site was closed with a 1-0 chromic in a running locked fashion starting at the left angle and moving towards the right. Copious irrigation behind the uterus ensued. The uterus was returned to the abdominal cavity. Paracolic gutters were cleared of all clot and debris. The hysterotomy site was noted to be hemostatic as well as the bladder flap and Interceed was placed over this. The peritoneum was reapproximated using a  2-0 chromic in a running fashion starting superiorly and moving inferiorly. Irrigation over the rectus muscles then occurred with Bovie cauterization of any bleeding sites. The fascia was reapproximated using a looped 0 PDS in a running fashion starting at the left angle and moving towards the right.  The subcutaneous fat layer was hemostatic and closed in an interrupted fashion with 2-0 Plain.  The skin was reapproximated with a 4-0 vicryl on a Harsha needle. All sponge, lap, and needle counts were correct x2. The patient was taken to the recovery room in stable condition.             Lilly Hidalgo MD  6/20/2024  19:04 EDT        Electronically signed by Lilly Hidalgo MD at 06/20/24 1904       Physician Progress Notes (last 24 hours)  Notes from 06/20/24 0707 through 06/21/24 0707   No notes of this type exist for this encounter.

## 2024-06-21 NOTE — ANESTHESIA POSTPROCEDURE EVALUATION
Patient: Fareed Garcia    Procedure Summary       Date: 24 Room / Location: [facility] LABOR DELIVERY   [facility]    Anesthesia Start:  Anesthesia Stop:     Procedure:  SECTION PRIMARY (Abdomen) Diagnosis:     Surgeons: Lilly Hidalgo MD Provider: Maycol Levy DO    Anesthesia Type: ITN, spinal ASA Status: 3 - Emergent            Anesthesia Type: ITN, spinal    Vitals  Vitals Value Taken Time   /56 24 0755   Temp 98.3 °F (36.8 °C) 24 0755   Pulse 116 24 0755   Resp 16 24 0755   SpO2 99 % 24   Vitals shown include unfiled device data.        Post Anesthesia Care and Evaluation    Patient location during evaluation: bedside  Patient participation: complete - patient participated  Level of consciousness: awake and alert  Pain management: adequate    Airway patency: patent  Anesthetic complications: No anesthetic complications    Cardiovascular status: acceptable  Respiratory status: acceptable  Hydration status: acceptable  Post Neuraxial Block status: Motor and sensory function returned to baseline and No signs or symptoms of PDPH

## 2024-06-21 NOTE — LACTATION NOTE
06/21/24 0939   Maternal Information   Date of Referral 06/21/24   Person Making Referral lactation consultant   Maternal Reason for Referral no prior breastfeeding experience  (courtesy visit for new delivery. Hx: breast reduction surgery in 2017. Pt states she's aware she may have reduction to no milk supply. Pt would like to pump only. She has a hand pump @bedside & family bringing in her Medela pump.)   Maternal Infant Feeding   Maternal Emotional State independent;receptive   Milk Expression/Equipment   Breast Pump Type double electric, personal  (Encouraged pt to pump q3hrs around the clock. Pt reminded that if no milk is produced by 2wks then it's highly likely she will not make any. 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

## 2024-06-21 NOTE — PROGRESS NOTES
2024    Name:Fareed Garcia    MR#:[id number]     PROGRESS NOTE:  Post-Op 1 S/P    HD:1    Subjective   29 y.o. yo Female  s/p CS at 38w6d doing well. Pain well controlled. Tolerating regular diet and having flatus. Lochia normal.  She denies dizziness, sob.      Pregnancy    Delivery of pregnancy by  section    Diet controlled gestational diabetes mellitus (GDM) in third trimester    Polyhydramnios in third trimester    Fetal macrosomia during pregnancy       Objective    Vitals  Temp:  Temp:  [97 °F (36.1 °C)-98.4 °F (36.9 °C)] 98.3 °F (36.8 °C)  Temp src: Oral  BP:  BP: (100-161)/(48-84) 119/56  Pulse:  Heart Rate:  [] 116  RR:   Resp:  [16-20] 16    General Awake, alert, no distress  Abdomen Soft, non-distended, fundus firm, below umbilicus, appropriately tender  Incision  Intact, no erythema or exudate  Extremities Calves NT bilaterally     I/O last 3 completed shifts:  In: 1300 [I.V.:1300]  Out: 1132 [Urine:400; Blood:732]    LABS:   Lab Results   Component Value Date    WBC 11.67 (H) 2024    HGB 8.2 (L) 2024    HCT 26.3 (L) 2024    MCV 85.1 2024     (L) 2024       Infant: female       Assessment   1.  POD 1, doing well   2.  GDMA1-- fingersticks dc'd   3.  Postop asymptomatic anemia   4.  MO--- Lovenox qd    Plan: Routine postoperative care.  CBC in am.  Advance.      Active Problems:   None      Nidhi Rodriguez, APRN  2024 09:34 EDT

## 2024-06-22 LAB
BASOPHILS # BLD AUTO: 0.02 10*3/MM3 (ref 0–0.2)
BASOPHILS NFR BLD AUTO: 0.1 % (ref 0–1.5)
DEPRECATED RDW RBC AUTO: 43.4 FL (ref 37–54)
EOSINOPHIL # BLD AUTO: 0.05 10*3/MM3 (ref 0–0.4)
EOSINOPHIL NFR BLD AUTO: 0.3 % (ref 0.3–6.2)
ERYTHROCYTE [DISTWIDTH] IN BLOOD BY AUTOMATED COUNT: 14.7 % (ref 12.3–15.4)
HCT VFR BLD AUTO: 25.3 % (ref 34–46.6)
HGB BLD-MCNC: 8 G/DL (ref 12–15.9)
IMM GRANULOCYTES # BLD AUTO: 0.14 10*3/MM3 (ref 0–0.05)
IMM GRANULOCYTES NFR BLD AUTO: 0.9 % (ref 0–0.5)
LYMPHOCYTES # BLD AUTO: 2.17 10*3/MM3 (ref 0.7–3.1)
LYMPHOCYTES NFR BLD AUTO: 13.6 % (ref 19.6–45.3)
MCH RBC QN AUTO: 26.1 PG (ref 26.6–33)
MCHC RBC AUTO-ENTMCNC: 31.6 G/DL (ref 31.5–35.7)
MCV RBC AUTO: 82.7 FL (ref 79–97)
MONOCYTES # BLD AUTO: 1.55 10*3/MM3 (ref 0.1–0.9)
MONOCYTES NFR BLD AUTO: 9.7 % (ref 5–12)
NEUTROPHILS NFR BLD AUTO: 12.04 10*3/MM3 (ref 1.7–7)
NEUTROPHILS NFR BLD AUTO: 75.4 % (ref 42.7–76)
NRBC BLD AUTO-RTO: 0 /100 WBC (ref 0–0.2)
PLATELET # BLD AUTO: 152 10*3/MM3 (ref 140–450)
PMV BLD AUTO: 12.1 FL (ref 6–12)
RBC # BLD AUTO: 3.06 10*6/MM3 (ref 3.77–5.28)
WBC NRBC COR # BLD AUTO: 15.97 10*3/MM3 (ref 3.4–10.8)

## 2024-06-22 PROCEDURE — 93010 ELECTROCARDIOGRAM REPORT: CPT | Performed by: INTERNAL MEDICINE

## 2024-06-22 PROCEDURE — 85025 COMPLETE CBC W/AUTO DIFF WBC: CPT

## 2024-06-22 PROCEDURE — 25010000002 ENOXAPARIN PER 10 MG: Performed by: OBSTETRICS & GYNECOLOGY

## 2024-06-22 PROCEDURE — 93005 ELECTROCARDIOGRAM TRACING: CPT

## 2024-06-22 RX ADMIN — ACETAMINOPHEN 650 MG: 325 TABLET ORAL at 04:55

## 2024-06-22 RX ADMIN — OXYCODONE HYDROCHLORIDE 10 MG: 10 TABLET ORAL at 22:37

## 2024-06-22 RX ADMIN — IBUPROFEN 600 MG: 600 TABLET, FILM COATED ORAL at 02:13

## 2024-06-22 RX ADMIN — IBUPROFEN 600 MG: 600 TABLET, FILM COATED ORAL at 09:35

## 2024-06-22 RX ADMIN — SIMETHICONE 80 MG: 80 TABLET, CHEWABLE ORAL at 02:13

## 2024-06-22 RX ADMIN — ENOXAPARIN SODIUM 40 MG: 100 INJECTION SUBCUTANEOUS at 09:35

## 2024-06-22 RX ADMIN — OXYCODONE HYDROCHLORIDE 10 MG: 10 TABLET ORAL at 10:02

## 2024-06-22 RX ADMIN — DOCUSATE SODIUM 100 MG: 100 CAPSULE, LIQUID FILLED ORAL at 22:37

## 2024-06-22 RX ADMIN — ACETAMINOPHEN 650 MG: 325 TABLET ORAL at 11:51

## 2024-06-22 RX ADMIN — ACETAMINOPHEN 650 MG: 325 TABLET ORAL at 22:36

## 2024-06-22 RX ADMIN — IBUPROFEN 600 MG: 600 TABLET, FILM COATED ORAL at 20:07

## 2024-06-22 RX ADMIN — SIMETHICONE 80 MG: 80 TABLET, CHEWABLE ORAL at 09:38

## 2024-06-22 RX ADMIN — PRENATAL VITAMINS-IRON FUMARATE 27 MG IRON-FOLIC ACID 0.8 MG TABLET 1 TABLET: at 09:35

## 2024-06-22 RX ADMIN — IBUPROFEN 600 MG: 600 TABLET, FILM COATED ORAL at 14:28

## 2024-06-22 RX ADMIN — OXYCODONE HYDROCHLORIDE 10 MG: 10 TABLET ORAL at 14:29

## 2024-06-22 RX ADMIN — ENOXAPARIN SODIUM 40 MG: 100 INJECTION SUBCUTANEOUS at 21:17

## 2024-06-22 RX ADMIN — DOCUSATE SODIUM 100 MG: 100 CAPSULE, LIQUID FILLED ORAL at 09:38

## 2024-06-22 RX ADMIN — OXYCODONE HYDROCHLORIDE 10 MG: 10 TABLET ORAL at 18:39

## 2024-06-22 RX ADMIN — ACETAMINOPHEN 650 MG: 325 TABLET ORAL at 17:25

## 2024-06-22 NOTE — PROGRESS NOTES
Postpartum Progress Note    Patient name: Fareed aGrcia  YOB: 1995   MRN: [id number]  Referring Provider: Lilly Hidalgo MD  Admission Date: 2024  Date of Service: 2024    ID: 29 y.o.     Diagnosis:   S/p  delivery 2 Days Post-Op     Pregnancy    Delivery of pregnancy by  section    Diet controlled gestational diabetes mellitus (GDM) in third trimester    Polyhydramnios in third trimester    Fetal macrosomia during pregnancy       Subjective:      No complaints.  Moderate lochia.  Ambulating, voiding, tolerating diet.  Pain well controlled.  The patient is currently breastfeeding.   This baby is a female.    Objective:      Vital signs:  Vital Signs Range for the last 24 hours  Temperature: Temp:  [98.2 °F (36.8 °C)-98.6 °F (37 °C)] 98.6 °F (37 °C)   Temp Source: Temp src: Oral   BP: BP: (120-149)/(40-85) 120/74   Pulse: Heart Rate:  [107-121] 109   Respirations: Resp:  [16-18] 16   Weight: 104 kg (229 lb)     General: Alert & oriented x4, in no apparent distress  Abdomen: soft, nontender  Uterus: firm, nontender  Incision: clean, dry, intact, dressing clean, sutures  Extremities: nontender; no edema      Labs:  Lab Results   Component Value Date    WBC 11.67 (H) 2024    HGB 8.2 (L) 2024    HCT 26.3 (L) 2024    MCV 85.1 2024     (L) 2024     Results from last 7 days   Lab Units 24  1654   ABO TYPING  O   RH TYPING  Positive     External Prenatal Results       Pregnancy Outside Results - Transcribed From Office Records - See Scanned Records For Details       Test Value Date Time    ABO  O  24 1654    Rh  Positive  24 1654    Antibody Screen  Negative  24 1550       Negative  24 1053       Negative  23 1120    Varicella IgG       Rubella  1.48 index 23 1120    Hgb  8.2 g/dL 24 0425       10.4 g/dL 24 1606       10.7 g/dL 24 1053       12.4 g/dL 23 1120     Hct  26.3 % 24 0425       32.5 % 24 1606       32.3 % 24 1053       37.8 % 23 1120    HgB A1c   5.6 % 24 1501       6.2 % 23 1120    1h GTT  173 mg/dL 23 1105    3h GTT Fasting  80 mg/dL 24 1517    3h GTT 1 hour  217 mg/dL 24 1517    3h GTT 2 hour  224 mg/dL 24 1517    3h GTT 3 hour   198 mg/dL 24 1517    Gonorrhea (discrete)  Negative  23 1120    Chlamydia (discrete)  Negative  23 1120    RPR  Non Reactive  24 1053       Non Reactive  23 1120    Syphilis Antibody       HBsAg  Negative  23 1120    Herpes Simplex Virus PCR       Herpes Simplex VIrus Culture       HIV  Non Reactive  23 1120    Hep C RNA Quant PCR       Hep C Antibody  Non Reactive  23 1120    AFP       NIPT       Cystic Fibroisis        Group B Strep ^ Positive  23     GBS Susceptibility to Clindamycin       GBS Susceptibility to Erythromycin       Fetal Fibronectin       Genetic Testing, Maternal Blood                 Drug Screening       Test Value Date Time    Urine Drug Screen       Amphetamine Screen  Negative  24 1620    Barbiturate Screen  Negative  24 1620    Benzodiazepine Screen  Negative  24 1620    Methadone Screen  Negative  24 1620    Phencyclidine Screen  Negative  24 1620    Opiates Screen  Negative  24 1620    THC Screen  Negative  24 1620    Cocaine Screen       Propoxyphene Screen       Buprenorphine Screen  Negative  24 1620    Methamphetamine Screen       Oxycodone Screen  Negative  24 1620    Tricyclic Antidepressants Screen  Negative  24 1620              Legend    ^: Historical                            Assessment/Plan:      2 Days Post-Op s/p Procedure(s):   SECTION PRIMARY  1. S/p  delivery: Continue postoperative care.  Doing well.  2. Infant feeding: Supportive care.  The patient is currently breastfeeding.  3. Postoperative anemia:  Recheck cbc in am  4. No flatus: Encouraged more ambulation, increase po fluids, continue simethicone and stool softeners.   5. Morbid obesity DVT prophylaxis: Continue Lovenox

## 2024-06-23 PROBLEM — D64.9 SYMPTOMATIC ANEMIA: Status: ACTIVE | Noted: 2024-06-23

## 2024-06-23 LAB
DEPRECATED RDW RBC AUTO: 43.6 FL (ref 37–54)
ERYTHROCYTE [DISTWIDTH] IN BLOOD BY AUTOMATED COUNT: 14.7 % (ref 12.3–15.4)
HCT VFR BLD AUTO: 23.7 % (ref 34–46.6)
HGB BLD-MCNC: 7.5 G/DL (ref 12–15.9)
MCH RBC QN AUTO: 26 PG (ref 26.6–33)
MCHC RBC AUTO-ENTMCNC: 31.6 G/DL (ref 31.5–35.7)
MCV RBC AUTO: 82.3 FL (ref 79–97)
PLATELET # BLD AUTO: 157 10*3/MM3 (ref 140–450)
PMV BLD AUTO: 12.1 FL (ref 6–12)
QT INTERVAL: 306 MS
QTC INTERVAL: 430 MS
RBC # BLD AUTO: 2.88 10*6/MM3 (ref 3.77–5.28)
WBC NRBC COR # BLD AUTO: 12.26 10*3/MM3 (ref 3.4–10.8)

## 2024-06-23 PROCEDURE — 86900 BLOOD TYPING SEROLOGIC ABO: CPT

## 2024-06-23 PROCEDURE — 86923 COMPATIBILITY TEST ELECTRIC: CPT

## 2024-06-23 PROCEDURE — P9016 RBC LEUKOCYTES REDUCED: HCPCS

## 2024-06-23 PROCEDURE — 85027 COMPLETE CBC AUTOMATED: CPT

## 2024-06-23 PROCEDURE — 36430 TRANSFUSION BLD/BLD COMPNT: CPT

## 2024-06-23 PROCEDURE — 25010000002 ENOXAPARIN PER 10 MG: Performed by: OBSTETRICS & GYNECOLOGY

## 2024-06-23 RX ORDER — POLYETHYLENE GLYCOL 3350 17 G/17G
17 POWDER, FOR SOLUTION ORAL AS NEEDED
Status: DISCONTINUED | OUTPATIENT
Start: 2024-06-23 | End: 2024-06-24 | Stop reason: HOSPADM

## 2024-06-23 RX ADMIN — POLYETHYLENE GLYCOL 3350 17 G: 17 POWDER, FOR SOLUTION ORAL at 04:52

## 2024-06-23 RX ADMIN — SIMETHICONE 80 MG: 80 TABLET, CHEWABLE ORAL at 09:29

## 2024-06-23 RX ADMIN — OXYCODONE HYDROCHLORIDE 5 MG: 5 TABLET ORAL at 09:29

## 2024-06-23 RX ADMIN — DOCUSATE SODIUM 100 MG: 100 CAPSULE, LIQUID FILLED ORAL at 09:29

## 2024-06-23 RX ADMIN — ENOXAPARIN SODIUM 40 MG: 100 INJECTION SUBCUTANEOUS at 09:30

## 2024-06-23 RX ADMIN — OXYCODONE HYDROCHLORIDE 5 MG: 5 TABLET ORAL at 17:12

## 2024-06-23 RX ADMIN — ENOXAPARIN SODIUM 40 MG: 100 INJECTION SUBCUTANEOUS at 20:27

## 2024-06-23 RX ADMIN — IBUPROFEN 600 MG: 600 TABLET, FILM COATED ORAL at 09:29

## 2024-06-23 RX ADMIN — IBUPROFEN 600 MG: 600 TABLET, FILM COATED ORAL at 14:40

## 2024-06-23 RX ADMIN — OXYCODONE HYDROCHLORIDE 10 MG: 10 TABLET ORAL at 21:15

## 2024-06-23 RX ADMIN — ACETAMINOPHEN 650 MG: 325 TABLET ORAL at 04:39

## 2024-06-23 RX ADMIN — ACETAMINOPHEN 650 MG: 325 TABLET ORAL at 23:01

## 2024-06-23 RX ADMIN — ACETAMINOPHEN 650 MG: 325 TABLET ORAL at 11:58

## 2024-06-23 RX ADMIN — ACETAMINOPHEN 650 MG: 325 TABLET ORAL at 17:08

## 2024-06-23 RX ADMIN — IBUPROFEN 600 MG: 600 TABLET, FILM COATED ORAL at 20:27

## 2024-06-23 RX ADMIN — IBUPROFEN 600 MG: 600 TABLET, FILM COATED ORAL at 02:00

## 2024-06-23 RX ADMIN — DOCUSATE SODIUM 100 MG: 100 CAPSULE, LIQUID FILLED ORAL at 20:27

## 2024-06-23 RX ADMIN — SIMETHICONE 80 MG: 80 TABLET, CHEWABLE ORAL at 20:27

## 2024-06-23 RX ADMIN — PRENATAL VITAMINS-IRON FUMARATE 27 MG IRON-FOLIC ACID 0.8 MG TABLET 1 TABLET: at 09:29

## 2024-06-23 NOTE — DISCHARGE SUMMARY
Postpartum Progress Note    Patient name: Fareed Garcia  YOB: 1995   MRN: [id number]  Referring Provider: Lilly Hidalgo MD  Admission Date: 2024  Date of Service: 2024    ID: 29 y.o.     Diagnosis:   S/p  delivery 3 Days Post-Op     Pregnancy    Delivery of pregnancy by  section    Diet controlled gestational diabetes mellitus (GDM) in third trimester    Polyhydramnios in third trimester    Fetal macrosomia during pregnancy    Symptomatic anemia       Subjective:      No complaints.  Moderate lochia.  Ambulating, voiding, tolerating diet.  Pain well controlled.  The patient is not currently breastfeeding.   This baby is a female.    Objective:      Vital signs:  Vital Signs Range for the last 24 hours  Temperature: Temp:  [98.3 °F (36.8 °C)-98.7 °F (37.1 °C)] 98.6 °F (37 °C)   Temp Source: Temp src: Oral   BP: BP: (121-139)/(60-91) 139/88   Pulse: Heart Rate:  [104-131] 112   Respirations: Resp:  [16-18] 18   Weight: 104 kg (229 lb)     General: Alert & oriented x4, in no apparent distress  Abdomen: soft, nontender  Uterus: firm, nontender  Incision: clean, dry, intact, dressing clean, sutures  Extremities: nontender; no edema      Labs:  Lab Results   Component Value Date    WBC 12.26 (H) 2024    HGB 7.5 (L) 2024    HCT 23.7 (L) 2024    MCV 82.3 2024     2024     Results from last 7 days   Lab Units 24  1654   ABO TYPING  O   RH TYPING  Positive     External Prenatal Results       Pregnancy Outside Results - Transcribed From Office Records - See Scanned Records For Details       Test Value Date Time    ABO  O  24 1654    Rh  Positive  24 1654    Antibody Screen  Negative  24 1550       Negative  24 1053       Negative  23 1120    Varicella IgG       Rubella  1.48 index 23 1120    Hgb  7.5 g/dL 24 0515       8.0 g/dL 24 1615       8.2 g/dL 24 0425        10.4 g/dL 24 1606       10.7 g/dL 24 1053       12.4 g/dL 23 1120    Hct  23.7 % 24 0515       25.3 % 24 1615       26.3 % 24 0425       32.5 % 24 1606       32.3 % 24 1053       37.8 % 23 1120    HgB A1c   5.6 % 24 1501       6.2 % 23 1120    1h GTT  173 mg/dL 23 1105    3h GTT Fasting  80 mg/dL 24 1517    3h GTT 1 hour  217 mg/dL 24 1517    3h GTT 2 hour  224 mg/dL 24 1517    3h GTT 3 hour   198 mg/dL 24 1517    Gonorrhea (discrete)  Negative  23 1120    Chlamydia (discrete)  Negative  23 1120    RPR  Non Reactive  24 1053       Non Reactive  23 1120    Syphilis Antibody       HBsAg  Negative  23 1120    Herpes Simplex Virus PCR       Herpes Simplex VIrus Culture       HIV  Non Reactive  23 1120    Hep C RNA Quant PCR       Hep C Antibody  Non Reactive  23 1120    AFP       NIPT       Cystic Fibroisis        Group B Strep ^ Positive  23     GBS Susceptibility to Clindamycin       GBS Susceptibility to Erythromycin       Fetal Fibronectin       Genetic Testing, Maternal Blood                 Drug Screening       Test Value Date Time    Urine Drug Screen       Amphetamine Screen  Negative  24 1620    Barbiturate Screen  Negative  24 1620    Benzodiazepine Screen  Negative  24 1620    Methadone Screen  Negative  24 1620    Phencyclidine Screen  Negative  24 1620    Opiates Screen  Negative  24 1620    THC Screen  Negative  24 1620    Cocaine Screen       Propoxyphene Screen       Buprenorphine Screen  Negative  24 1620    Methamphetamine Screen       Oxycodone Screen  Negative  24 1620    Tricyclic Antidepressants Screen  Negative  24 1620              Legend    ^: Historical                            Assessment/Plan:      3 Days Post-Op s/p Procedure(s):   SECTION PRIMARY  1. S/p  delivery: Continue  postoperative care.  Doing well.  2. Infant feeding: Supportive care.  The patient is currently breastfeeding.  3. Morbid obesity DVT prophylaxis: Continue Lovenox   4. Symptomatic postoperative anemia: Patient states she is feeling weak and tired. Desires two units of PRBC today and recheck cbc in am.

## 2024-06-24 VITALS
HEIGHT: 60 IN | BODY MASS INDEX: 44.96 KG/M2 | WEIGHT: 229 LBS | OXYGEN SATURATION: 97 % | SYSTOLIC BLOOD PRESSURE: 125 MMHG | RESPIRATION RATE: 18 BRPM | HEART RATE: 108 BPM | DIASTOLIC BLOOD PRESSURE: 58 MMHG | TEMPERATURE: 98.3 F

## 2024-06-24 LAB
BH BB BLOOD EXPIRATION DATE: NORMAL
BH BB BLOOD EXPIRATION DATE: NORMAL
BH BB BLOOD TYPE BARCODE: 5100
BH BB BLOOD TYPE BARCODE: 5100
BH BB DISPENSE STATUS: NORMAL
BH BB DISPENSE STATUS: NORMAL
BH BB PRODUCT CODE: NORMAL
BH BB PRODUCT CODE: NORMAL
BH BB UNIT NUMBER: NORMAL
BH BB UNIT NUMBER: NORMAL
CROSSMATCH INTERPRETATION: NORMAL
CROSSMATCH INTERPRETATION: NORMAL
DEPRECATED RDW RBC AUTO: 44.1 FL (ref 37–54)
ERYTHROCYTE [DISTWIDTH] IN BLOOD BY AUTOMATED COUNT: 14.6 % (ref 12.3–15.4)
HCT VFR BLD AUTO: 28 % (ref 34–46.6)
HGB BLD-MCNC: 9.1 G/DL (ref 12–15.9)
MCH RBC QN AUTO: 27 PG (ref 26.6–33)
MCHC RBC AUTO-ENTMCNC: 32.5 G/DL (ref 31.5–35.7)
MCV RBC AUTO: 83.1 FL (ref 79–97)
PLATELET # BLD AUTO: 177 10*3/MM3 (ref 140–450)
PMV BLD AUTO: 11.7 FL (ref 6–12)
RBC # BLD AUTO: 3.37 10*6/MM3 (ref 3.77–5.28)
UNIT  ABO: NORMAL
UNIT  ABO: NORMAL
UNIT  RH: NORMAL
UNIT  RH: NORMAL
WBC NRBC COR # BLD AUTO: 12.43 10*3/MM3 (ref 3.4–10.8)

## 2024-06-24 PROCEDURE — 25010000002 ENOXAPARIN PER 10 MG: Performed by: OBSTETRICS & GYNECOLOGY

## 2024-06-24 PROCEDURE — 85027 COMPLETE CBC AUTOMATED: CPT

## 2024-06-24 PROCEDURE — 25010000002 FUROSEMIDE PER 20 MG: Performed by: ADVANCED PRACTICE MIDWIFE

## 2024-06-24 PROCEDURE — 0503F POSTPARTUM CARE VISIT: CPT | Performed by: ADVANCED PRACTICE MIDWIFE

## 2024-06-24 RX ORDER — PSEUDOEPHEDRINE HCL 30 MG
100 TABLET ORAL 2 TIMES DAILY PRN
Qty: 60 CAPSULE | Refills: 0 | Status: SHIPPED | OUTPATIENT
Start: 2024-06-24 | End: 2024-07-08

## 2024-06-24 RX ORDER — SIMETHICONE 80 MG
80 TABLET,CHEWABLE ORAL 4 TIMES DAILY PRN
Qty: 40 TABLET | Refills: 0 | Status: SHIPPED | OUTPATIENT
Start: 2024-06-24

## 2024-06-24 RX ORDER — IBUPROFEN 600 MG/1
600 TABLET ORAL EVERY 6 HOURS
Qty: 90 TABLET | Refills: 0 | Status: SHIPPED | OUTPATIENT
Start: 2024-06-24 | End: 2024-07-08

## 2024-06-24 RX ORDER — POLYETHYLENE GLYCOL 3350 17 G/17G
17 POWDER, FOR SOLUTION ORAL DAILY PRN
Qty: 510 G | Refills: 0 | Status: SHIPPED | OUTPATIENT
Start: 2024-06-24 | End: 2024-07-08

## 2024-06-24 RX ORDER — FUROSEMIDE 10 MG/ML
20 INJECTION INTRAMUSCULAR; INTRAVENOUS ONCE
Status: COMPLETED | OUTPATIENT
Start: 2024-06-24 | End: 2024-06-24

## 2024-06-24 RX ORDER — OXYCODONE HYDROCHLORIDE 5 MG/1
5 TABLET ORAL EVERY 6 HOURS PRN
Qty: 12 TABLET | Refills: 0 | Status: SHIPPED | OUTPATIENT
Start: 2024-06-24 | End: 2024-06-27

## 2024-06-24 RX ADMIN — PRENATAL VITAMINS-IRON FUMARATE 27 MG IRON-FOLIC ACID 0.8 MG TABLET 1 TABLET: at 08:58

## 2024-06-24 RX ADMIN — DOCUSATE SODIUM 100 MG: 100 CAPSULE, LIQUID FILLED ORAL at 08:58

## 2024-06-24 RX ADMIN — ACETAMINOPHEN 650 MG: 325 TABLET ORAL at 04:44

## 2024-06-24 RX ADMIN — OXYCODONE HYDROCHLORIDE 10 MG: 10 TABLET ORAL at 01:42

## 2024-06-24 RX ADMIN — IBUPROFEN 600 MG: 600 TABLET, FILM COATED ORAL at 01:40

## 2024-06-24 RX ADMIN — FUROSEMIDE 20 MG: 10 INJECTION, SOLUTION INTRAMUSCULAR; INTRAVENOUS at 12:22

## 2024-06-24 RX ADMIN — IBUPROFEN 600 MG: 600 TABLET, FILM COATED ORAL at 08:58

## 2024-06-24 RX ADMIN — ENOXAPARIN SODIUM 40 MG: 100 INJECTION SUBCUTANEOUS at 08:57

## 2024-06-24 RX ADMIN — POLYETHYLENE GLYCOL 3350 17 G: 17 POWDER, FOR SOLUTION ORAL at 11:19

## 2024-06-24 RX ADMIN — ACETAMINOPHEN 650 MG: 325 TABLET ORAL at 11:19

## 2024-06-24 NOTE — DISCHARGE SUMMARY
Discharge Summary    Date of Admission: 2024  Date of Discharge:  2024      Patient: Fareed Garcia      MR#:[id number]    Primary Surgeon/OB: Lilly Hidalgo MD    Discharge Surgeon/OB: Lilly Hidalgo MD    Presenting Problem/History of Present Illness  Pregnancy [Z34.90]       Pregnancy    Delivery of pregnancy by  section    Diet controlled gestational diabetes mellitus (GDM) in third trimester    Polyhydramnios in third trimester    Fetal macrosomia during pregnancy    Symptomatic anemia         Discharge Diagnosis:  section at 38w6d    Procedures:  , Low Transverse     2024    6:12 PM        Rh Immune globulin given: not applicable    Rubella vaccine given: not applicable    Discharge Date: 2024; Discharge Time: 11:43 EDT    Early Discharge:  NO    Hospital Course  Patient is a 29 y.o. female  at 38w6d status post  section with postoperative recovery complicated by tachycardia due to anemia, with subsequent transfusion of 2 units PRBC on POD3.  Patient was advanced to regular diet on postoperative day#1.  On discharge, ambulating, tolerating a regular diet without any difficulties and her incision is dry, clean and intact.     Infant:   female  fetus 4410 g (9 lb 11.6 oz)  with Apgar scores of 7 , 8  at five minutes.    Condition on Discharge:  Stable    Vital Signs  Temp:  [98 °F (36.7 °C)-98.7 °F (37.1 °C)] 98.3 °F (36.8 °C)  Heart Rate:  [106-122] 108  Resp:  [16-24] 18  BP: (117-146)/(55-94) 125/58    Lab Results   Component Value Date    WBC 12.43 (H) 2024    HGB 9.1 (L) 2024    HCT 28.0 (L) 2024    MCV 83.1 2024     2024       Discharge Disposition  Home or Self Care    Discharge Medications     Discharge Medications        New Medications        Instructions Start Date   docusate sodium 100 MG capsule   100 mg, Oral, 2 Times Daily PRN      ibuprofen 600 MG tablet  Commonly known as: ADVIL,MOTRIN    600 mg, Oral, Every 6 Hours      oxyCODONE 5 MG immediate release tablet  Commonly known as: ROXICODONE   5 mg, Oral, Every 6 Hours PRN      polyethylene glycol 17 g packet  Commonly known as: MIRALAX   17 g, Oral, Daily PRN      simethicone 80 MG chewable tablet  Commonly known as: MYLICON   80 mg, Oral, 4 Times Daily PRN             Continue These Medications        Instructions Start Date   ferrous sulfate 325 (65 FE) MG tablet   325 mg, Oral, Daily With Breakfast      prenatal (CLASSIC) vitamin 28-0.8 MG tablet tablet  Generic drug: prenatal vitamin   1 tablet, Oral, Daily             Stop These Medications      Accu-Chek Guide test strip  Generic drug: glucose blood     Accu-Chek Guide w/Device kit     Accu-Chek Softclix Lancets lancets              Discharge Diet:     Activity at Discharge:   Activity Instructions       Activity as Tolerated      Other Instructions (Specify)      No driving for 2 weeks, No lifting over 10lbs for 6 weeks.    Pelvic Rest      Pelvic rest including no tampons, no tub baths, and no intercourse until 6 weeks/cleared by Provider.            Follow-up Appointments  Future Appointments   Date Time Provider Department Center   7/8/2024  3:10 PM Lilly Hidalgo MD [facility]     Additional Instructions for the Follow-ups that You Need to Schedule       Call MD for problems / concerns.   As directed      Call with foul smelling discharge, fever of >100.4, signs of postpartum depression, saturating a pad in <1 hour, blood clots larger than a golf ball.    Order Comments: Call with foul smelling discharge, fever of >100.4, signs of postpartum depression, saturating a pad in <1 hour, blood clots larger than a golf ball.         Discharge Follow-up with Specialty: Nurse practitioner in Dr. Hidalgo's office; 2 Weeks   As directed      Specialty: Nurse practitioner in Dr. Hidalgo's office   Follow Up: 2 Weeks   Follow Up Details: incision check                Claudia Livingston  MARIOLA  06/24/24  11:42 EDT  [facility]

## 2024-06-24 NOTE — LACTATION NOTE
06/24/24 0825   Maternal Information   Date of Referral 06/24/24   Person Making Referral lactation consultant   Maternal Reason for Referral no prior breastfeeding experience     Courtesy follow up visit. MOB reports she is not pumping because her nipple is not being pulled into flange of pump due to history of breast reduction. Encouraged to call if she wants assistance with pumping or if her plans change.

## 2024-07-03 ENCOUNTER — MATERNAL SCREENING (OUTPATIENT)
Dept: CALL CENTER | Facility: HOSPITAL | Age: 29
End: 2024-07-03
Payer: COMMERCIAL

## 2024-07-03 NOTE — OUTREACH NOTE
Maternal Screening Survey      Flowsheet Row Responses   Facility patient discharged from? [facility]   Attempt successful? Yes   Call start time 1325   Call end time 1329   EPD Scale: Able to Laugh 0-->as much as she always could   EPD Scale: Looked Forward 1-->rather less than she used to   EPD Scale: Blamed Self 0-->no, never   EPD Scale: Been Anxious 0-->no, not at all   EPD Scale: Felt Panicky 0-->no, not at all   EPD Scale: Things Getting on Top 0-->no, has been coping as well as ever   EPD Scale: Difficulty Sleeping 0-->no, not at all   EPD Scale: Sad or Miserable 0-->no, not at all   EPD Scale: Crying 0-->no, never   EPD Scale: Thought of Harming Self 0-->never   [place]  Depression Score 1   Did any of your parents have problems with alcohol or drug use? No   Do any of your peers have problems with alcohol or drug use? No   Does your partner have problems with alcohol or drug use? No   Before you were pregnant did you have problems with alcohol or drug use? (past) No   In the past month, did you drink beer, wine, liquor or use any other drugs? (pregnancy) No   Maternal Screening call completed Yes   Call end time 1329              Edie BRUSH - Registered Nurse

## 2024-07-08 ENCOUNTER — POSTPARTUM VISIT (OUTPATIENT)
Dept: OBSTETRICS AND GYNECOLOGY | Facility: CLINIC | Age: 29
End: 2024-07-08
Payer: COMMERCIAL

## 2024-07-08 VITALS
WEIGHT: 199 LBS | BODY MASS INDEX: 39.07 KG/M2 | HEIGHT: 60 IN | SYSTOLIC BLOOD PRESSURE: 120 MMHG | DIASTOLIC BLOOD PRESSURE: 82 MMHG

## 2024-07-08 PROBLEM — O24.410 DIET CONTROLLED GESTATIONAL DIABETES MELLITUS (GDM) IN THIRD TRIMESTER: Status: RESOLVED | Noted: 2024-01-03 | Resolved: 2024-07-08

## 2024-07-08 PROBLEM — O99.019 MATERNAL ANEMIA IN PREGNANCY, ANTEPARTUM: Status: RESOLVED | Noted: 2024-04-22 | Resolved: 2024-07-08

## 2024-07-08 PROBLEM — O36.60X0 FETAL MACROSOMIA DURING PREGNANCY: Status: RESOLVED | Noted: 2024-06-20 | Resolved: 2024-07-08

## 2024-07-08 PROBLEM — Z34.90 PREGNANCY: Status: RESOLVED | Noted: 2024-06-20 | Resolved: 2024-07-08

## 2024-07-08 PROBLEM — O24.410 DIET CONTROLLED GESTATIONAL DIABETES MELLITUS (GDM) IN THIRD TRIMESTER: Status: RESOLVED | Noted: 2024-06-20 | Resolved: 2024-07-08

## 2024-07-08 PROBLEM — O35.BXX0 FETAL CARDIAC ANOMALY COMPLICATING PREGNANCY, ANTEPARTUM: Status: RESOLVED | Noted: 2024-03-22 | Resolved: 2024-07-08

## 2024-07-08 PROBLEM — O40.3XX0 POLYHYDRAMNIOS IN THIRD TRIMESTER: Status: RESOLVED | Noted: 2024-06-20 | Resolved: 2024-07-08

## 2024-07-08 PROBLEM — O99.210 OBESITY IN PREGNANCY, ANTEPARTUM: Status: RESOLVED | Noted: 2023-11-28 | Resolved: 2024-07-08

## 2024-07-08 PROBLEM — Z34.90 PRENATAL CARE, ANTEPARTUM: Status: RESOLVED | Noted: 2023-11-28 | Resolved: 2024-07-08

## 2024-07-08 PROCEDURE — 0503F POSTPARTUM CARE VISIT: CPT | Performed by: OBSTETRICS & GYNECOLOGY

## 2024-07-08 NOTE — PROGRESS NOTES
"      Chief Complaint   Patient presents with    Postpartum Care       Postpartum Visit         Fareed Garcia is a 29 y.o.  who presents today for a 2 week(s) postpartum check.      C/S: suspected macro     , Low Transverse    Information for the patient's :  JAI Garcia [[id number]]   2024   female   JAI Garcia   4410 g (9 lb 11.6 oz)   Gestational Age: 38w6d      Baby Discharged with Mom  Delivering MD: Lilly Hidalgo MD.    Pregnancy complications: gestational DM     Patient reports her incision is clean, dry, intact. Patient describes vaginal bleeding as light. She is bottle feeding. She would like to discuss the following complaints today: vaginal odor. Denies vagina itching/burning.    She desires to discuss  IUD  for contraception.    Patient denies concerns for postpartum depression/anxiety. Patient denies  suicidal or homicidal ideation. Her postpartum depression screening questionnaire: 6.  She might be interested in restarting her buspar. She sees PMHNP at [facility].      The additional following portions of the patient's history were reviewed and updated as appropriate: allergies, current medications, past family history, past medical history, past social history, past surgical history, and problem list.      Review of Systems   Constitutional: Negative.    HENT: Negative.     Eyes: Negative.    Respiratory: Negative.     Cardiovascular: Negative.    Gastrointestinal: Negative.    Endocrine: Negative.    Genitourinary: Negative.    Musculoskeletal: Negative.    Skin: Negative.    Allergic/Immunologic: Negative.    Neurological: Negative.    Hematological: Negative.    Psychiatric/Behavioral: Negative.         I have reviewed and agree with the HPI, ROS, and historical information as entered above. Lilly Hidalgo MD      Objective   /82   Ht 152.4 cm (60\")   Wt 90.3 kg (199 lb)   LMP 2023 (Exact Date)   Breastfeeding No   BMI 38.86 kg/m² "     Physical Exam  Vitals and nursing note reviewed.   Constitutional:       Appearance: She is well-developed.   HENT:      Head: Normocephalic and atraumatic.   Pulmonary:      Effort: Pulmonary effort is normal.   Abdominal:      General: A surgical scar is present.      Palpations: Abdomen is soft. Abdomen is not rigid.      Comments: Clean, Dry, and Intact.  No erythema.    Musculoskeletal:      Cervical back: Normal range of motion.   Neurological:      Mental Status: She is alert and oriented to person, place, and time.   Psychiatric:         Mood and Affect: Mood normal.         Behavior: Behavior normal.              Assessment and Plan    Problem List Items Addressed This Visit          Gravid and     Delivery of pregnancy by  section    Overview     C/S 24 @ 38w6d GDM and macro 9#12oz 'Tracelyn'          Other Visit Diagnoses       Postpartum follow-up    -  Primary            S/p , 2 week(s) postpartum.  Doing well.    Continued pelvic rest with a return to driving and light physical activity.  Incision healing well.  Patient reaching out to life stance to discuss restarting her medicine.  Contraception: contraceptive methods: IUD  Return in about 4 weeks (around 2024) for PP visit.    Lilly Hidalgo MD  2024

## 2024-07-09 NOTE — PROGRESS NOTES
"Enter Query Response Below      Query Response: Acute blood loss anemia is due to  section  Electronically signed by Lilly Hidalgo MD, 24, 3:34 PM EDT.               If applicable, please update the problem list.   Patient: Fareed Garcia        : 1995  Account: [id number]           Admit Date: 2024        How to Respond to this query:       a. Click New Note     b. Answer query within the yellow box.                c. Update the Problem List, if applicable.      If you have any questions about this query contact me at: [email or web address]  [phone]      Dr. Hidalgo:     29 y F on home po Ferrous sulfate admitted () with \"maternal anemia in pregnancy, antepartum.\" Hgb 10.4 on admission. Hgb 8.0 (). \"Symptomatic postoperative anemia: Patient states she is feeling weak and tired. Desires two units of PRBC today and recheck cbc in am,\" per discharge summary (). Hospital course (), \"status post  section with postoperative recovery complicated by tachycardia due to anemia, with subsequent transfusion of 2 units PRBC on POD3.\" Discharge summary diagnosis () of \"Symptomatic anemia\" continued on po Ferrous sulfate.     Please clarify if patient treated/monitored for the following:    Acute blood loss anemia due to (please specify): ________  Iron deficiency anemia  Postpartum anemia  Other- specify__________  Unable to determine    By submitting this query, we are merely seeking further clarification of documentation to accurately reflect all conditions that you are monitoring, evaluating, treating or that extend the hospitalization or utilize additional resources of care. Please utilize your independent clinical judgment when addressing the question(s) above.     This query and your response, once completed, will be entered into the legal medical record.    Sincerely,    CHAYO Palumbo, RN, CCDS  Clinical Documentation Integrity Program     "

## 2024-08-08 ENCOUNTER — POSTPARTUM VISIT (OUTPATIENT)
Dept: OBSTETRICS AND GYNECOLOGY | Facility: CLINIC | Age: 29
End: 2024-08-08
Payer: COMMERCIAL

## 2024-08-08 VITALS
DIASTOLIC BLOOD PRESSURE: 80 MMHG | WEIGHT: 190 LBS | BODY MASS INDEX: 37.3 KG/M2 | SYSTOLIC BLOOD PRESSURE: 120 MMHG | HEIGHT: 60 IN

## 2024-08-08 PROBLEM — R53.83 FATIGUE: Status: RESOLVED | Noted: 2024-01-04 | Resolved: 2024-08-08

## 2024-08-08 PROBLEM — M54.9 BACKACHE: Status: RESOLVED | Noted: 2024-01-04 | Resolved: 2024-08-08

## 2024-08-08 PROBLEM — M25.50 JOINT PAIN: Status: RESOLVED | Noted: 2024-01-04 | Resolved: 2024-08-08

## 2024-08-08 PROBLEM — D64.9 SYMPTOMATIC ANEMIA: Status: RESOLVED | Noted: 2024-06-23 | Resolved: 2024-08-08

## 2024-08-08 PROBLEM — B95.1 POSITIVE GBS TEST: Status: RESOLVED | Noted: 2023-12-05 | Resolved: 2024-08-08

## 2024-08-08 RX ORDER — BUPROPION HYDROCHLORIDE 100 MG/1
TABLET, EXTENDED RELEASE ORAL
COMMUNITY
Start: 2024-07-15

## 2024-08-08 RX ORDER — BUSPIRONE HYDROCHLORIDE 10 MG/1
TABLET ORAL
COMMUNITY
Start: 2024-07-16

## 2024-08-08 NOTE — PROGRESS NOTES
Chief Complaint   Patient presents with    Postpartum Care       Postpartum Visit         Fareed Garcia is a 29 y.o.  who presents today for a 6 week(s) postpartum check.     C/S: suspected macro     , Low Transverse    Information for the patient's :  Clayton Garcia [[id number]]   2024   female   Clayton Garcia   4410 g (9 lb 11.6 oz)   Gestational Age: 38w6d        Baby Discharged: Discharged with Mom  Delivering Physician: Lilly Hidalgo MD    Her pregnancy was complicated by gestational DM . The incision is healing well. Patient describes vaginal bleeding as light today. She had her first period on Saturday, it was very heavy.  Patient is bottle feeding.  She desires to discuss her options  for contraception.  She is between OCPs and IUD.    She would like to discuss the following complaints today: check the incision. She doesn't think it is infected but wanted to make sure it is healing properly.    Patient reports concerns for postpartum depression/anxiety. Patient denies  suicidal or homicidal ideation. Her postpartum depression screening questionnaire: 7. She is currently being treated with Wellbutrin and Buspar. She feels it is helping some. She just started it 2 weeks ago with her PCP. She sees a psych NP as well.      Last Pap : 22. Results: negative. HPV: not done. Her last HPV testing was  and it was negative..   Last Completed Pap Smear            Ordered - PAP SMEAR (Every 3 Years) Ordered on 2023  LIQUID-BASED PAP SMEAR, [facility] ([facility])    2022  SCANNED - PAP SMEAR                      The additional following portions of the patient's history were reviewed and updated as appropriate: allergies, current medications, past family history, past medical history, past social history, past surgical history, and problem list.    Review of Systems   Constitutional: Negative.    HENT: Negative.     Eyes:  "Negative.    Respiratory: Negative.     Cardiovascular: Negative.    Gastrointestinal: Negative.    Endocrine: Negative.    Genitourinary: Negative.    Musculoskeletal: Negative.    Skin: Negative.    Allergic/Immunologic: Negative.    Neurological: Negative.    Hematological: Negative.    Psychiatric/Behavioral: Negative.       All other systems reviewed and are negative.     I have reviewed and agree with the HPI, ROS, and historical information as entered above. Lilly Hidalgo MD      /80   Ht 152.4 cm (60\")   Wt 86.2 kg (190 lb)   LMP 2023 (Exact Date)   Breastfeeding No   BMI 37.11 kg/m²     Physical Exam  Vitals and nursing note reviewed. Exam conducted with a chaperone present.   Constitutional:       Appearance: She is well-developed.   HENT:      Head: Normocephalic and atraumatic.   Pulmonary:      Effort: Pulmonary effort is normal.   Abdominal:      General: A surgical scar is present.      Palpations: Abdomen is soft. Abdomen is not rigid.      Comments: Clean, Dry, and Intact.  No erythema.    Genitourinary:     Vagina: No lesions or prolapsed vaginal walls.      Cervix: No lesion or erythema.      Uterus: Enlarged. Not tender and no uterine prolapse.       Adnexa:         Right: No mass, tenderness or fullness.          Left: No mass, tenderness or fullness.     Musculoskeletal:      Cervical back: Normal range of motion.   Neurological:      Mental Status: She is alert and oriented to person, place, and time.   Psychiatric:         Mood and Affect: Mood normal.         Behavior: Behavior normal.             Assessment and Plan    Problem List Items Addressed This Visit          Gravid and     Delivery of pregnancy by  section    Overview     C/S 24 @ 38w6d GDM and macro 9#12oz 'Clayton'          Other Visit Diagnoses       Postpartum follow-up    -  Primary    Relevant Orders    LIQUID-BASED PAP SMEAR WITH HPV GENOTYPING REGARDLESS OF INTERPRETATION " ([facility])            S/p , 6 week(s) postpartum.  Doing well.    Return to normal physical activity.  No pelvic restrictions.   Baby doing well.  Contraception: contraceptive methods: IUD.  Insertion date: precert for Mirena and place in 4 weeks  Return in about 4 weeks (around 2024) for Precert Mirena and place next visit.     Lilly Hidalgo MD  2024

## 2024-08-16 LAB — REF LAB TEST METHOD: NORMAL

## 2024-09-05 ENCOUNTER — OFFICE VISIT (OUTPATIENT)
Dept: OBSTETRICS AND GYNECOLOGY | Facility: CLINIC | Age: 29
End: 2024-09-05
Payer: COMMERCIAL

## 2024-09-05 VITALS
BODY MASS INDEX: 37.5 KG/M2 | DIASTOLIC BLOOD PRESSURE: 78 MMHG | HEIGHT: 60 IN | SYSTOLIC BLOOD PRESSURE: 120 MMHG | WEIGHT: 191 LBS

## 2024-09-05 DIAGNOSIS — Z30.430 ENCOUNTER FOR IUD INSERTION: ICD-10-CM

## 2024-09-05 DIAGNOSIS — Z97.5 IUD (INTRAUTERINE DEVICE) IN PLACE: Primary | ICD-10-CM

## 2024-09-05 LAB
B-HCG UR QL: NEGATIVE
EXPIRATION DATE: NORMAL
INTERNAL NEGATIVE CONTROL: NORMAL
INTERNAL POSITIVE CONTROL: NORMAL
Lab: NORMAL

## 2024-09-05 NOTE — PROGRESS NOTES
"     Gynecologic Procedure Note        Procedure: IUD Insertion     IUD insertion    Date/Time: 9/5/2024 2:44 PM    Performed by: Lilly Hidalgo MD  Authorized by: Lilly Hidalgo MD  Preparation: Patient was prepped and draped in the usual sterile fashion.  Local anesthesia used: no    Anesthesia:  Local anesthesia used: no    Sedation:  Patient sedated: no    Patient tolerance: patient tolerated the procedure well with no immediate complications          Pre procedure indication 1) Desires Mirena  Post procedure indication 1) Desires Mirena    NDC: Mirena 81411-697-88  Lot #: [id number]  Exp Date: 8/2026  BH device    /78   Ht 152.4 cm (60\")   Wt 86.6 kg (191 lb)   LMP 08/30/2024   BMI 37.30 kg/m²       The risks, benefits, and alternatives to Mirena were explained at length with the patient. All her questions were answered and consents were signed.  Her LMP was 1 week ago .  Urine Pregnancy Test was Negative.  Patient does not have an allergy to betadine or shellfish.    Time out: immediate members of the procedure team and patient agree to the following: correct patient, correct site, correct procedure to be performed. Lilly Hidalgo MD      The patient was placed in a dorsal lithotomy position on the examining table in [facility]. A bimanual exam confirmed the uterus was midposition. A speculum was inserted into the vagina and the cervix was brought into view.  The cervix was prepped with Betadine. The anterior lip of the cervix was then grasped with a single-tooth tenaculum. The endometrial cavity was then sounded to 8 centimeters. The sealed Mirena package was opened and the IUD was removed in a sterile fashion.    The upper edge of the depth setting the flange was set at the uterine sound measurement. The  was then carefully advanced to the cervical canal into the uterus to the level of the fundus.  The slider was then retracted about 1 cm and deployed the device. The " device was then gently advanced to the fundus. The IUD was then released by pulling the slider down all the way. The  was removed carefully from the uterus. The threads were then cut leaving 2-3 cm visible outside of the cervix.  The single-tooth tenaculum was removed from the anterior lip. Good hemostasis was noted.   All other instruments were removed from the vagina.       The patient tolerated the procedure well with a mild amount of discomfort.  She was monitored for 5 minutes prior to discharge.      There were no complications.    The patient was counseled about the need to return in 4 weeks for IUD check.     She was counseled about the need to use a backup method of contraception such as condoms until her post insertion exam was performed. The patient verbalized understanding when the IUD will need to be removed/replaced. Written information was given to the patient.  The patient is counseled to contact us if she has any significant or increasing bleeding, pain, fever, chills, or other concerns. She is instructed to see a doctor right away if she believes that she may be pregnant at any time with the IUD in place.    Return in about 4 weeks (around 10/3/2024) for ultrasound.      Lilly Hidalgo MD  09/05/2024

## 2024-10-03 PROBLEM — Z97.5 IUD (INTRAUTERINE DEVICE) IN PLACE: Status: ACTIVE | Noted: 2024-10-03

## 2025-02-27 NOTE — OP NOTE
[facility]   Section Operative Note    Pre-Operative Dx:   1.  IUP at 38w6d  weeks     2. Macrosomia  Unfavorable cervix  Polyhydramnios  Poorly controlled gestational diabetes      Postoperative dx:    1.  Same     Procedure: Procedure(s):   SECTION PRIMARY   Surgeon: Lilly Hidalgo MD    Assistant: Surgeon(s):  Lilly Hidalgo MD        Anesthesia:     EBL:   mls.  <500ml mls.         IV Fluids: 1200 mls.   UOP: 25 mls.       Antibiotics: cefazolin (Ancef)     Infant:            Gender: female  infant    Weight: No birth weight on file.     Apgars: 7  @ 1 minute /     8  @ 5 minutes    Fetal presentation: cephalic   Amniotic fluid: Meconium stained, copious amount      Complications:   None      Disposition:   Mother to Mother Baby/Postpartum  in stable condition currently.   Baby to NICU  in stable condition currently.   Indication: Patient being followed for gestational diabetes in pregnancy.  Ultrasound by  diagnostic center on 2024 was shown fetal macrosomia along with polyhydramnios.  Due to concern for this being an indication of uncontrolled gestational diabetes, PDC recommended delivery today.  In discussion with patient her cervix was not favorable and there is a concern for fetal macrosomia with an abdominal circumference it is out of range and baby measuring 9 pounds 12 ounces.  Patient elected for an out right  section for delivery.    Procedure:   The patient was taken to the operating room where spinal anesthesia was placed, and she was placed in supine position with a leftward tilt. Sequential compression devices on bilateral lower extremities and a Kuhn catheter placed in her bladder. After being prepped and draped in a normal sterile fashion, a Pfannenstiel skin incision was made with a scalpel and taken down to the level of the fascia using the Bovie. The fascia was incised in the midline and extended laterally. The superior edge of the  fascia was grasped with Kocher clamps, elevated and the rectus muscle dissected off. In a similar fashion, the inferior edge of the fascia was grasped with Kocher clamps, elevated and the rectus muscles dissected off. The rectus muscles were bisected in the midline. The peritoneum was identified, grasped and entered into sharply using Metzenbaum scissors. This incision was extended superiorly and inferiorly with good visualization of the bladder. Bladder blade was placed. A bladder flap was created. A low transverse uterine incision was made with a scalpel and extended laterally. Amniotomy with meconium stained fluid occurred at the time of hysterotomy. The head was brought to the uterine incision, and using fundal pressure, the head delivered without complication. Nose and mouth were bulb suctioned.  Shoulders and body were to follow.  After 1 minute the cord was clamped x2 and cut. Infant was handed to the awaiting pediatric team.  Cord blood and gases were obtained. The placenta was manually extracted. The uterus was exteriorized and cleared of all clot and debris and the hysterotomy site was closed with a 1-0 chromic in a running locked fashion starting at the left angle and moving towards the right. Copious irrigation behind the uterus ensued. The uterus was returned to the abdominal cavity. Paracolic gutters were cleared of all clot and debris. The hysterotomy site was noted to be hemostatic as well as the bladder flap and Interceed was placed over this. The peritoneum was reapproximated using a 2-0 chromic in a running fashion starting superiorly and moving inferiorly. Irrigation over the rectus muscles then occurred with Bovie cauterization of any bleeding sites. The fascia was reapproximated using a looped 0 PDS in a running fashion starting at the left angle and moving towards the right.  The subcutaneous fat layer was hemostatic and closed in an interrupted fashion with 2-0 Plain.  The skin was  reapproximated with a 4-0 vicryl on a Harsha needle. All sponge, lap, and needle counts were correct x2. The patient was taken to the recovery room in stable condition.             Lilly Hidalgo MD  6/20/2024  19:04 EDT       [facility]

## (undated) DEVICE — TRAP FLD MINIVAC MEGADYNE 100ML

## (undated) DEVICE — SUT GUT CHRM 1 CTX 36IN 905H

## (undated) DEVICE — SUT VIC 4/0 KS 27IN VCP662H

## (undated) DEVICE — PENCL SMOKE/EVAC MEGADYNE TELESCP 10FT

## (undated) DEVICE — SUT PDS 1 TP1 48IN Z880G BX/12

## (undated) DEVICE — GLV SURG BIOGEL LTX PF 6 1/2

## (undated) DEVICE — CLTH CLENS READYCLEANSE PERI CARE PK/5

## (undated) DEVICE — SUT GUT CHRM 2/0 CT1 27IN 811H

## (undated) DEVICE — TRY SPINE BLCK WHITACRE 25G 3X5IN

## (undated) DEVICE — COATED VICRYL  (POLYGLACTIN 910) SUTURE, VIOLET BRAIDED, STERILE, SYNTHETIC ABSORBABLE SUTURE: Brand: COATED VICRYL

## (undated) DEVICE — ADHS SKIN PREMIERPRO EXOFIN TOPICAL HI/VISC .5ML

## (undated) DEVICE — 4-PORT MANIFOLD: Brand: NEPTUNE 2

## (undated) DEVICE — PK C/SECT 10

## (undated) DEVICE — SOL IRR NACL 0.9PCT BT 1000ML

## (undated) DEVICE — SOL IRR H2O BTL 1000ML STRL

## (undated) DEVICE — PATIENT RETURN ELECTRODE, SINGLE-USE, CONTACT QUALITY MONITORING, ADULT, WITH 9FT CORD, FOR PATIENTS WEIGING OVER 33LBS. (15KG): Brand: MEGADYNE

## (undated) DEVICE — MAT PREVALON MOBL TRANSFR AIR WO/PAD 39X80IN

## (undated) DEVICE — APPL CHLORAPREP TINTED 26ML TEAL